# Patient Record
Sex: FEMALE | Race: OTHER | HISPANIC OR LATINO | Employment: UNEMPLOYED | URBAN - METROPOLITAN AREA
[De-identification: names, ages, dates, MRNs, and addresses within clinical notes are randomized per-mention and may not be internally consistent; named-entity substitution may affect disease eponyms.]

---

## 2023-01-07 LAB
ABO + RH BLD: NORMAL
HCT VFR BLD AUTO: 36.4 % (ref 36–46)
HGB BLD-MCNC: 11.9 G/DL (ref 12–16)
PLATELET # BLD AUTO: 238 K/UL (ref 150–399)

## 2023-04-24 ENCOUNTER — TELEPHONE (OUTPATIENT)
Dept: OBSTETRICS AND GYNECOLOGY | Facility: CLINIC | Age: 38
End: 2023-04-24
Payer: MEDICAID

## 2023-04-24 ENCOUNTER — INITIAL PRENATAL (OUTPATIENT)
Dept: OBSTETRICS AND GYNECOLOGY | Facility: CLINIC | Age: 38
End: 2023-04-24
Payer: MEDICAID

## 2023-04-24 VITALS — SYSTOLIC BLOOD PRESSURE: 107 MMHG | DIASTOLIC BLOOD PRESSURE: 65 MMHG | WEIGHT: 143.5 LBS

## 2023-04-24 DIAGNOSIS — O09.522 AMA (ADVANCED MATERNAL AGE) MULTIGRAVIDA 35+, SECOND TRIMESTER: ICD-10-CM

## 2023-04-24 DIAGNOSIS — Z3A.22 22 WEEKS GESTATION OF PREGNANCY: ICD-10-CM

## 2023-04-24 DIAGNOSIS — Z34.92 NORMAL PREGNANCY IN SECOND TRIMESTER: Primary | ICD-10-CM

## 2023-04-24 DIAGNOSIS — O09.92 SUPERVISION OF HIGH RISK PREGNANCY IN SECOND TRIMESTER: Primary | ICD-10-CM

## 2023-04-24 DIAGNOSIS — O09.32 INSUFFICIENT PRENATAL CARE IN SECOND TRIMESTER: ICD-10-CM

## 2023-04-24 PROBLEM — F41.9 ANXIETY: Status: ACTIVE | Noted: 2023-04-24

## 2023-04-24 PROBLEM — J45.41 MODERATE PERSISTENT ASTHMA WITH EXACERBATION: Status: ACTIVE | Noted: 2023-02-21

## 2023-04-24 PROCEDURE — 99999 PR PBB SHADOW E&M-NEW PATIENT-LVL II: CPT | Mod: PBBFAC,,, | Performed by: ADVANCED PRACTICE MIDWIFE

## 2023-04-24 PROCEDURE — 99999 PR PBB SHADOW E&M-NEW PATIENT-LVL II: ICD-10-PCS | Mod: PBBFAC,,, | Performed by: ADVANCED PRACTICE MIDWIFE

## 2023-04-24 PROCEDURE — 99214 PR OFFICE/OUTPT VISIT, EST, LEVL IV, 30-39 MIN: ICD-10-PCS | Mod: TH,S$PBB,, | Performed by: ADVANCED PRACTICE MIDWIFE

## 2023-04-24 PROCEDURE — 87086 URINE CULTURE/COLONY COUNT: CPT | Performed by: ADVANCED PRACTICE MIDWIFE

## 2023-04-24 PROCEDURE — 99202 OFFICE O/P NEW SF 15 MIN: CPT | Mod: PBBFAC,TH | Performed by: ADVANCED PRACTICE MIDWIFE

## 2023-04-24 PROCEDURE — 87591 N.GONORRHOEAE DNA AMP PROB: CPT | Performed by: ADVANCED PRACTICE MIDWIFE

## 2023-04-24 PROCEDURE — 99214 OFFICE O/P EST MOD 30 MIN: CPT | Mod: TH,S$PBB,, | Performed by: ADVANCED PRACTICE MIDWIFE

## 2023-04-24 RX ORDER — ASCORBIC ACID, CHOLECALCIFEROL, DL-.ALPHA.-TOCOPHEROL ACETATE, THIAMINE HYDROCHLORIDE, RIBOFLAVIN, NIACINAMIDE, PYRIDOXINE HYDROCHLORIDE, FOLIC ACID, CYANOCOBALAMIN, CALCIUM CARBONATE, FERROUS FUMARATE, ZINC OXIDE, CUPRIC SULFATE 100; 400; 30; 1.6; 1.6; 20; 3.1; 1; 12; 200; 27; 10; 2 MG/1; [IU]/1; [IU]/1; MG/1; MG/1; MG/1; MG/1; MG/1; UG/1; MG/1; MG/1; MG/1; MG/1
1 TABLET, COATED ORAL DAILY
Qty: 90 TABLET | Refills: 3 | Status: SHIPPED | OUTPATIENT
Start: 2023-04-24 | End: 2023-05-04

## 2023-04-24 RX ORDER — FERROUS SULFATE 325(65) MG
325 TABLET, DELAYED RELEASE (ENTERIC COATED) ORAL 2 TIMES DAILY
Qty: 60 TABLET | Refills: 3 | Status: SHIPPED | OUTPATIENT
Start: 2023-04-24 | End: 2023-05-04

## 2023-04-24 RX ORDER — ALBUTEROL SULFATE 90 UG/1
2 AEROSOL, METERED RESPIRATORY (INHALATION) EVERY 4 HOURS PRN
COMMUNITY
Start: 2023-04-10

## 2023-04-24 RX ORDER — FERROUS SULFATE 325(65) MG
325 TABLET, DELAYED RELEASE (ENTERIC COATED) ORAL 2 TIMES DAILY
Qty: 60 TABLET | Refills: 3 | Status: SHIPPED | OUTPATIENT
Start: 2023-04-24 | End: 2023-04-24 | Stop reason: SDUPTHER

## 2023-04-24 RX ORDER — ASCORBIC ACID, CHOLECALCIFEROL, DL-.ALPHA.-TOCOPHEROL ACETATE, THIAMINE HYDROCHLORIDE, RIBOFLAVIN, NIACINAMIDE, PYRIDOXINE HYDROCHLORIDE, FOLIC ACID, CYANOCOBALAMIN, CALCIUM CARBONATE, FERROUS FUMARATE, ZINC OXIDE, CUPRIC SULFATE 100; 400; 30; 1.6; 1.6; 20; 3.1; 1; 12; 200; 27; 10; 2 MG/1; [IU]/1; [IU]/1; MG/1; MG/1; MG/1; MG/1; MG/1; UG/1; MG/1; MG/1; MG/1; MG/1
1 TABLET, COATED ORAL DAILY
Qty: 90 TABLET | Refills: 3 | Status: SHIPPED | OUTPATIENT
Start: 2023-04-24 | End: 2023-04-24 | Stop reason: SDUPTHER

## 2023-04-24 RX ORDER — ALBUTEROL SULFATE 0.83 MG/ML
2.5 SOLUTION RESPIRATORY (INHALATION) EVERY 6 HOURS PRN
COMMUNITY
Start: 2023-02-23 | End: 2024-02-23

## 2023-04-24 RX ORDER — CETIRIZINE HYDROCHLORIDE 10 MG/1
10 TABLET ORAL DAILY
COMMUNITY
Start: 2023-02-23

## 2023-04-24 RX ORDER — ASPIRIN 81 MG/1
81 TABLET ORAL DAILY
Refills: 0 | Status: ON HOLD | COMMUNITY
Start: 2023-04-24 | End: 2023-08-25 | Stop reason: HOSPADM

## 2023-04-24 NOTE — PROGRESS NOTES
NOB [facility] and given and EDC 8/28/23  Lbs ordered today  sono at next visit  Patient does not drive and has transportation issues  RX given for vitamins and iron   [facility]  A-Z book given  Refuses pap and genprobe today  Urine collected will do genprobe and pap pp

## 2023-04-24 NOTE — TELEPHONE ENCOUNTER
----- Message from Vania David sent at 4/24/2023  1:26 PM CDT -----  Contact: Carolyn  Patient is calling to speak with the nurse regarding medication. Reports prenatal vit103-iron fum-folic () 27 mg iron- 1 mg Tab was sent to the wrong pharmacy. Please sent .  [facility] - [place] - [address] [facility]  [address] [facility]  [place] [address]  Phone: [phone] Fax: [phone]    Please give patient a call back at .[phone] to notify.

## 2023-04-24 NOTE — TELEPHONE ENCOUNTER
----- Message from Vania David sent at 4/24/2023  1:26 PM CDT -----  Contact: Carolyn  Patient is calling to speak with the nurse regarding medication. Reports prenatal vit103-iron fum-folic () 27 mg iron- 1 mg Tab was sent to the wrong pharmacy. Please sent .  [facility] - [place] - [address] [facility]  [address] [facility]  [place] [address]  Phone: [phone] Fax: [phone]    Please give patient a call back at .[phone] to notify.

## 2023-04-26 LAB
BACTERIA UR CULT: NO GROWTH
C TRACH DNA SPEC QL NAA+PROBE: NOT DETECTED
N GONORRHOEA DNA SPEC QL NAA+PROBE: NOT DETECTED

## 2023-05-01 ENCOUNTER — TELEPHONE (OUTPATIENT)
Dept: OBSTETRICS AND GYNECOLOGY | Facility: CLINIC | Age: 38
End: 2023-05-01
Payer: MEDICAID

## 2023-05-01 NOTE — TELEPHONE ENCOUNTER
Incoming call from patient. Patient states she missed transports call from pickup and when she tried to call back, transport told patient they will not turn around for her. Assisted patient with canceling and rescheduling appointment 3 days in advance. Patient verbalized understanding of appointment time and date.

## 2023-05-01 NOTE — TELEPHONE ENCOUNTER
Called patient and advised unable to reschedule appointments today.  Patient will keep appointments as scheduled on Thursday.

## 2023-05-01 NOTE — TELEPHONE ENCOUNTER
----- Message from Vianca Castro sent at 5/1/2023  8:54 AM CDT -----  Regarding: Please advise  Who Called:LEONID PINEDO [[id number]]         What is the reqeust in detail: Requesting call back to discuss if pt can come in today for appt. Please advise         Can the clinic reply by [facility]?no         Best Call Back Number:[phone]           Additional Information:

## 2023-05-04 ENCOUNTER — ROUTINE PRENATAL (OUTPATIENT)
Dept: OBSTETRICS AND GYNECOLOGY | Facility: CLINIC | Age: 38
End: 2023-05-04
Payer: MEDICAID

## 2023-05-04 ENCOUNTER — PROCEDURE VISIT (OUTPATIENT)
Dept: OBSTETRICS AND GYNECOLOGY | Facility: CLINIC | Age: 38
End: 2023-05-04
Payer: MEDICAID

## 2023-05-04 VITALS
BODY MASS INDEX: 28.39 KG/M2 | WEIGHT: 144.63 LBS | SYSTOLIC BLOOD PRESSURE: 116 MMHG | HEIGHT: 60 IN | DIASTOLIC BLOOD PRESSURE: 62 MMHG

## 2023-05-04 DIAGNOSIS — O99.512 ASTHMA AFFECTING PREGNANCY IN SECOND TRIMESTER: ICD-10-CM

## 2023-05-04 DIAGNOSIS — O43.192 MARGINAL INSERTION OF UMBILICAL CORD AFFECTING MANAGEMENT OF MOTHER IN SECOND TRIMESTER: ICD-10-CM

## 2023-05-04 DIAGNOSIS — Z34.92 NORMAL PREGNANCY IN SECOND TRIMESTER: ICD-10-CM

## 2023-05-04 DIAGNOSIS — O09.522 AMA (ADVANCED MATERNAL AGE) MULTIGRAVIDA 35+, SECOND TRIMESTER: Primary | ICD-10-CM

## 2023-05-04 DIAGNOSIS — Z36.2 ENCOUNTER FOR FOLLOW-UP ULTRASOUND OF FETAL ANATOMY: ICD-10-CM

## 2023-05-04 DIAGNOSIS — J45.909 ASTHMA AFFECTING PREGNANCY IN SECOND TRIMESTER: ICD-10-CM

## 2023-05-04 PROBLEM — F32.A DEPRESSION, UNSPECIFIED: Status: ACTIVE | Noted: 2022-03-28

## 2023-05-04 PROCEDURE — 99214 OFFICE O/P EST MOD 30 MIN: CPT | Mod: TH,S$PBB,, | Performed by: ADVANCED PRACTICE MIDWIFE

## 2023-05-04 PROCEDURE — 99999 PR PBB SHADOW E&M-EST. PATIENT-LVL III: CPT | Mod: PBBFAC,,, | Performed by: ADVANCED PRACTICE MIDWIFE

## 2023-05-04 PROCEDURE — 99214 PR OFFICE/OUTPT VISIT, EST, LEVL IV, 30-39 MIN: ICD-10-PCS | Mod: TH,S$PBB,, | Performed by: ADVANCED PRACTICE MIDWIFE

## 2023-05-04 PROCEDURE — 99999 PR PBB SHADOW E&M-EST. PATIENT-LVL III: ICD-10-PCS | Mod: PBBFAC,,, | Performed by: ADVANCED PRACTICE MIDWIFE

## 2023-05-04 PROCEDURE — 99213 OFFICE O/P EST LOW 20 MIN: CPT | Mod: PBBFAC,TH,PO,25 | Performed by: ADVANCED PRACTICE MIDWIFE

## 2023-05-04 PROCEDURE — 76811 US OB/GYN PROCEDURE (VIEWPOINT): ICD-10-PCS | Mod: 26,S$PBB,, | Performed by: OBSTETRICS & GYNECOLOGY

## 2023-05-04 PROCEDURE — 76811 OB US DETAILED SNGL FETUS: CPT | Mod: PBBFAC,PO | Performed by: OBSTETRICS & GYNECOLOGY

## 2023-05-04 RX ORDER — ONDANSETRON 4 MG/1
4 TABLET, ORALLY DISINTEGRATING ORAL 2 TIMES DAILY
Qty: 30 TABLET | Refills: 0 | Status: SHIPPED | OUTPATIENT
Start: 2023-05-04 | End: 2023-06-08 | Stop reason: SDUPTHER

## 2023-05-04 RX ORDER — PROMETHAZINE HYDROCHLORIDE 12.5 MG/1
12.5 TABLET ORAL EVERY 6 HOURS PRN
Qty: 30 TABLET | Refills: 0 | Status: SHIPPED | OUTPATIENT
Start: 2023-05-04 | End: 2023-06-08 | Stop reason: SDUPTHER

## 2023-05-04 NOTE — PROGRESS NOTES
37 y.o. female  at 23w3d   Reports + FM, denies VB, LOF, or cramping  Doing well without concerns, some nausea off and on, requesting medication   /62   Wt 65.6 kg (144 lb 10 oz)   TW lbs   Reviewed upcoming 28wk labs, (O+) and orders placed  Some prenatal labs done at [facility] at beginning of pregnancy, will add ones that are missing to 28wk labs  Tdap handout provided and explained    AMA (advanced maternal age) multigravida 35+, second trimester  -     Hepatitis Panel, Acute; Future; Expected date: 2023  -     Rubella Antibody, IgG; Future; Expected date: 2023  -     HIV 1/2 Ag/Ab (4th Gen); Future; Expected date: 2023  -     RPR; Future; Expected date: 2023  -     OB GLUCOSE SCREEN; Future; Expected date: 2023  -     Type & Screen; Future; Expected date: 2023  -     CBC W/ AUTO DIFFERENTIAL; Future; Expected date: 2023    Encounter for follow-up ultrasound of fetal anatomy    Asthma affecting pregnancy in second trimester  -     Ambulatory referral/consult to Pulmonology; Future; Expected date: 2023    Other orders  -     ondansetron (ZOFRAN-ODT) 4 MG TbDL; Take 1 tablet (4 mg total) by mouth 2 (two) times daily.  Dispense: 30 tablet; Refill: 0  -     promethazine (PHENERGAN) 12.5 MG Tab; Take 1 tablet (12.5 mg total) by mouth every 6 (six) hours as needed (Nausea).  Dispense: 30 tablet; Refill: 0       Ultrasound today with cephalic presentation, posterior placenta, 3VC, marginal insertion, MEGHAN WNL, EFW 36%, 1lb 5oz, CL34.9mm, suboptimal spine/heart, will repeat in 4 weeks, pt aware MFM will read scan  Reviewed warning signs, normal FM,  labor precautions and how/when to call.  RTC x 4 wks, call or present sooner prn.

## 2023-06-08 ENCOUNTER — TELEPHONE (OUTPATIENT)
Dept: OBSTETRICS AND GYNECOLOGY | Facility: CLINIC | Age: 38
End: 2023-06-08
Payer: MEDICAID

## 2023-06-08 ENCOUNTER — LAB VISIT (OUTPATIENT)
Dept: LAB | Facility: HOSPITAL | Age: 38
End: 2023-06-08
Attending: ADVANCED PRACTICE MIDWIFE
Payer: MEDICAID

## 2023-06-08 ENCOUNTER — PROCEDURE VISIT (OUTPATIENT)
Dept: OBSTETRICS AND GYNECOLOGY | Facility: CLINIC | Age: 38
End: 2023-06-08
Payer: MEDICAID

## 2023-06-08 ENCOUNTER — ROUTINE PRENATAL (OUTPATIENT)
Dept: OBSTETRICS AND GYNECOLOGY | Facility: CLINIC | Age: 38
End: 2023-06-08
Payer: MEDICAID

## 2023-06-08 VITALS
BODY MASS INDEX: 30.53 KG/M2 | WEIGHT: 156.31 LBS | DIASTOLIC BLOOD PRESSURE: 68 MMHG | SYSTOLIC BLOOD PRESSURE: 116 MMHG

## 2023-06-08 DIAGNOSIS — O43.192 MARGINAL INSERTION OF UMBILICAL CORD AFFECTING MANAGEMENT OF MOTHER IN SECOND TRIMESTER: ICD-10-CM

## 2023-06-08 DIAGNOSIS — O09.522 AMA (ADVANCED MATERNAL AGE) MULTIGRAVIDA 35+, SECOND TRIMESTER: ICD-10-CM

## 2023-06-08 DIAGNOSIS — O40.3XX0 POLYHYDRAMNIOS IN THIRD TRIMESTER COMPLICATION, SINGLE OR UNSPECIFIED FETUS: ICD-10-CM

## 2023-06-08 DIAGNOSIS — Z34.92 NORMAL PREGNANCY IN SECOND TRIMESTER: ICD-10-CM

## 2023-06-08 DIAGNOSIS — B37.31 VAGINA, CANDIDIASIS: ICD-10-CM

## 2023-06-08 DIAGNOSIS — O09.523 AMA (ADVANCED MATERNAL AGE) MULTIGRAVIDA 35+, THIRD TRIMESTER: Primary | ICD-10-CM

## 2023-06-08 LAB
ABO + RH BLD: NORMAL
BASOPHILS # BLD AUTO: 0.07 K/UL (ref 0–0.2)
BASOPHILS NFR BLD: 0.5 % (ref 0–1.9)
BLD GP AB SCN CELLS X3 SERPL QL: NORMAL
DIFFERENTIAL METHOD: ABNORMAL
EOSINOPHIL # BLD AUTO: 0.2 K/UL (ref 0–0.5)
EOSINOPHIL NFR BLD: 1 % (ref 0–8)
ERYTHROCYTE [DISTWIDTH] IN BLOOD BY AUTOMATED COUNT: 16 % (ref 11.5–14.5)
GLUCOSE SERPL-MCNC: 89 MG/DL (ref 70–140)
HCT VFR BLD AUTO: 28.6 % (ref 37–48.5)
HGB BLD-MCNC: 8.8 G/DL (ref 12–16)
IMM GRANULOCYTES # BLD AUTO: 0.49 K/UL (ref 0–0.04)
IMM GRANULOCYTES NFR BLD AUTO: 3.3 % (ref 0–0.5)
LYMPHOCYTES # BLD AUTO: 2.1 K/UL (ref 1–4.8)
LYMPHOCYTES NFR BLD: 14.1 % (ref 18–48)
MCH RBC QN AUTO: 26.6 PG (ref 27–31)
MCHC RBC AUTO-ENTMCNC: 30.8 G/DL (ref 32–36)
MCV RBC AUTO: 86 FL (ref 82–98)
MONOCYTES # BLD AUTO: 1.5 K/UL (ref 0.3–1)
MONOCYTES NFR BLD: 9.8 % (ref 4–15)
NEUTROPHILS # BLD AUTO: 10.7 K/UL (ref 1.8–7.7)
NEUTROPHILS NFR BLD: 71.3 % (ref 38–73)
NRBC BLD-RTO: 0 /100 WBC
PLATELET # BLD AUTO: 277 K/UL (ref 150–450)
PMV BLD AUTO: 11.2 FL (ref 9.2–12.9)
RBC # BLD AUTO: 3.31 M/UL (ref 4–5.4)
SPECIMEN OUTDATE: NORMAL
WBC # BLD AUTO: 14.93 K/UL (ref 3.9–12.7)

## 2023-06-08 PROCEDURE — 99999 PR PBB SHADOW E&M-EST. PATIENT-LVL III: CPT | Mod: PBBFAC,,, | Performed by: ADVANCED PRACTICE MIDWIFE

## 2023-06-08 PROCEDURE — 86592 SYPHILIS TEST NON-TREP QUAL: CPT | Performed by: ADVANCED PRACTICE MIDWIFE

## 2023-06-08 PROCEDURE — 82950 GLUCOSE TEST: CPT | Performed by: ADVANCED PRACTICE MIDWIFE

## 2023-06-08 PROCEDURE — 86762 RUBELLA ANTIBODY: CPT | Performed by: ADVANCED PRACTICE MIDWIFE

## 2023-06-08 PROCEDURE — 76816 US OB/GYN PROCEDURE (VIEWPOINT): ICD-10-PCS | Mod: 26,S$PBB,, | Performed by: OBSTETRICS & GYNECOLOGY

## 2023-06-08 PROCEDURE — 87389 HIV-1 AG W/HIV-1&-2 AB AG IA: CPT | Performed by: ADVANCED PRACTICE MIDWIFE

## 2023-06-08 PROCEDURE — 85025 COMPLETE CBC W/AUTO DIFF WBC: CPT | Performed by: ADVANCED PRACTICE MIDWIFE

## 2023-06-08 PROCEDURE — 36415 COLL VENOUS BLD VENIPUNCTURE: CPT | Mod: PO | Performed by: ADVANCED PRACTICE MIDWIFE

## 2023-06-08 PROCEDURE — 76816 OB US FOLLOW-UP PER FETUS: CPT | Mod: PBBFAC,PO | Performed by: OBSTETRICS & GYNECOLOGY

## 2023-06-08 PROCEDURE — 99214 PR OFFICE/OUTPT VISIT, EST, LEVL IV, 30-39 MIN: ICD-10-PCS | Mod: TH,S$PBB,, | Performed by: ADVANCED PRACTICE MIDWIFE

## 2023-06-08 PROCEDURE — 86900 BLOOD TYPING SEROLOGIC ABO: CPT | Performed by: ADVANCED PRACTICE MIDWIFE

## 2023-06-08 PROCEDURE — 99214 OFFICE O/P EST MOD 30 MIN: CPT | Mod: TH,S$PBB,, | Performed by: ADVANCED PRACTICE MIDWIFE

## 2023-06-08 PROCEDURE — 99213 OFFICE O/P EST LOW 20 MIN: CPT | Mod: PBBFAC,TH,PO | Performed by: ADVANCED PRACTICE MIDWIFE

## 2023-06-08 PROCEDURE — 80074 ACUTE HEPATITIS PANEL: CPT | Performed by: ADVANCED PRACTICE MIDWIFE

## 2023-06-08 PROCEDURE — 99999 PR PBB SHADOW E&M-EST. PATIENT-LVL III: ICD-10-PCS | Mod: PBBFAC,,, | Performed by: ADVANCED PRACTICE MIDWIFE

## 2023-06-08 RX ORDER — PROMETHAZINE HYDROCHLORIDE 12.5 MG/1
12.5 TABLET ORAL EVERY 6 HOURS PRN
Qty: 30 TABLET | Refills: 0 | Status: ON HOLD | OUTPATIENT
Start: 2023-06-08 | End: 2023-08-25 | Stop reason: HOSPADM

## 2023-06-08 RX ORDER — FLUCONAZOLE 100 MG/1
100 TABLET ORAL DAILY
Qty: 3 TABLET | Refills: 0 | Status: SHIPPED | OUTPATIENT
Start: 2023-06-08 | End: 2023-06-11

## 2023-06-08 RX ORDER — ONDANSETRON 4 MG/1
4 TABLET, ORALLY DISINTEGRATING ORAL 2 TIMES DAILY
Qty: 30 TABLET | Refills: 0 | Status: SHIPPED | OUTPATIENT
Start: 2023-06-08

## 2023-06-08 NOTE — TELEPHONE ENCOUNTER
After calling patient with no response. [facility] check in called nurses station saying pt just received call about appt being cancelled. Check in stated pt has been sitting in lobby and did not check in for appt. Per Jessica, she will still see patient.

## 2023-06-08 NOTE — PROGRESS NOTES
38 y.o. female  at 28w3d   Reports + FM, denies VB, LOF or CTX  Doing well without concerns   /68   Wt 70.9 kg (156 lb 4.9 oz)   BMI 30.53 kg/m²   TW lbs   28wk labs today (O POS)  Tdap declined    AMA (advanced maternal age) multigravida 35+, third trimester    Marginal insertion of umbilical cord affecting management of mother in second trimester    Ultrasound today with variable presentation, posterior placenta, 3VC, MVP 10.0cm, MEGHAN 27.9cm, polyhydramnios, EFW 44%, 2lb 12oz, reviewed with pt, will repeat at 32 weeks  Reviewed warning signs, normal FKCs,  labor precautions and how/when to call.  RTC x 2 wks, call or present sooner prn.

## 2023-06-08 NOTE — TELEPHONE ENCOUNTER
Called to inform pt that she missed her 1:40 pm ultrasound and to assist with r/s. Patient did not answer. LVM to rtc to clinc.

## 2023-06-09 LAB
HAV IGM SERPL QL IA: NORMAL
HBV CORE IGM SERPL QL IA: NORMAL
HBV SURFACE AG SERPL QL IA: NORMAL
HCV AB SERPL QL IA: NORMAL
HIV 1+2 AB+HIV1 P24 AG SERPL QL IA: NORMAL
RPR SER QL: NORMAL
RUBV IGG SER-ACNC: 17.6 IU/ML
RUBV IGG SER-IMP: REACTIVE

## 2023-06-15 ENCOUNTER — TELEPHONE (OUTPATIENT)
Dept: OBSTETRICS AND GYNECOLOGY | Facility: CLINIC | Age: 38
End: 2023-06-15
Payer: MEDICAID

## 2023-06-15 ENCOUNTER — TELEPHONE (OUTPATIENT)
Dept: HEMATOLOGY/ONCOLOGY | Facility: CLINIC | Age: 38
End: 2023-06-15
Payer: MEDICAID

## 2023-06-15 DIAGNOSIS — O99.013 ANEMIA OF MOTHER IN PREGNANCY, ANTEPARTUM, THIRD TRIMESTER: ICD-10-CM

## 2023-06-15 NOTE — TELEPHONE ENCOUNTER
----- Message from Clovis Owens CNM sent at 6/15/2023 12:55 PM CDT -----  Pt has 2:15 visit with me tomorrow with 3pm ultrasound. I also have 3 patients after 3pm. Can you please call her and see if she can come for an ultrasound at 9AM tomorrow at the [place]? I have placed her in the spot just in case. Thank you.

## 2023-06-22 ENCOUNTER — TELEPHONE (OUTPATIENT)
Dept: OBSTETRICS AND GYNECOLOGY | Facility: CLINIC | Age: 38
End: 2023-06-22
Payer: MEDICAID

## 2023-06-22 NOTE — TELEPHONE ENCOUNTER
----- Message from Angie Flores sent at 6/22/2023 12:24 PM CDT -----  Regarding: pt request  Name of Who is Calling:LEONID PIENDO [[id number]]          What is the request in detail: pt needs to reschedule her appt 7/27 is too far           Can the clinic reply by [facility]: no           What Number to Call Back if not in [facility]: [phone] (home)

## 2023-06-27 ENCOUNTER — ROUTINE PRENATAL (OUTPATIENT)
Dept: OBSTETRICS AND GYNECOLOGY | Facility: CLINIC | Age: 38
End: 2023-06-27
Payer: MEDICAID

## 2023-06-27 VITALS
SYSTOLIC BLOOD PRESSURE: 118 MMHG | WEIGHT: 161.63 LBS | BODY MASS INDEX: 31.56 KG/M2 | DIASTOLIC BLOOD PRESSURE: 64 MMHG

## 2023-06-27 DIAGNOSIS — O40.3XX0 POLYHYDRAMNIOS IN THIRD TRIMESTER COMPLICATION, SINGLE OR UNSPECIFIED FETUS: Primary | ICD-10-CM

## 2023-06-27 DIAGNOSIS — O23.43 UTI (URINARY TRACT INFECTION) DURING PREGNANCY, THIRD TRIMESTER: ICD-10-CM

## 2023-06-27 DIAGNOSIS — O99.013 ANEMIA OF MOTHER IN PREGNANCY, ANTEPARTUM, THIRD TRIMESTER: ICD-10-CM

## 2023-06-27 DIAGNOSIS — O09.523 AMA (ADVANCED MATERNAL AGE) MULTIGRAVIDA 35+, THIRD TRIMESTER: ICD-10-CM

## 2023-06-27 PROCEDURE — 99214 PR OFFICE/OUTPT VISIT, EST, LEVL IV, 30-39 MIN: ICD-10-PCS | Mod: TH,S$PBB,, | Performed by: ADVANCED PRACTICE MIDWIFE

## 2023-06-27 PROCEDURE — 99213 OFFICE O/P EST LOW 20 MIN: CPT | Mod: PBBFAC,TH,PO | Performed by: ADVANCED PRACTICE MIDWIFE

## 2023-06-27 PROCEDURE — 99999 PR PBB SHADOW E&M-EST. PATIENT-LVL III: CPT | Mod: PBBFAC,,, | Performed by: ADVANCED PRACTICE MIDWIFE

## 2023-06-27 PROCEDURE — 99214 OFFICE O/P EST MOD 30 MIN: CPT | Mod: TH,S$PBB,, | Performed by: ADVANCED PRACTICE MIDWIFE

## 2023-06-27 PROCEDURE — 99999 PR PBB SHADOW E&M-EST. PATIENT-LVL III: ICD-10-PCS | Mod: PBBFAC,,, | Performed by: ADVANCED PRACTICE MIDWIFE

## 2023-06-27 NOTE — PROGRESS NOTES
38 y.o. female  at 31w1d   Reports + FM, denies VB, LOF or CTX  Doing well without concerns, did mention that she went to [facility] and was diagnosed with UTI, was given Macrobid that she is currently taking, will retest at nv  /64   Wt 73.3 kg (161 lb 9.6 oz)   BMI 31.56 kg/m²   TW lbs   Reviewed 28wk lab results (O POS)  Anemia, reviewed with pt and referral placed already with hematology, just waiting for call to set up  Passed GTT  Tdap declined    Polyhydramnios in third trimester complication, single or unspecified fetus  -     US OB/GYN Procedure ([facility])-Future; Future    AMA (advanced maternal age) multigravida 35+, third trimester    Anemia of mother in pregnancy, antepartum, third trimester    UTI (urinary tract infection) during pregnancy, third trimester    Reviewed warning signs, normal FKCs,  labor precautions and how/when to call.  RTC x 2 wks, call or present sooner prn.

## 2023-06-28 PROBLEM — O23.43 UTI (URINARY TRACT INFECTION) DURING PREGNANCY, THIRD TRIMESTER: Status: ACTIVE | Noted: 2023-06-28

## 2023-07-26 ENCOUNTER — HOSPITAL ENCOUNTER (OUTPATIENT)
Facility: HOSPITAL | Age: 38
Discharge: HOME | End: 2023-07-26
Attending: OBSTETRICS & GYNECOLOGY | Admitting: OBSTETRICS & GYNECOLOGY
Payer: MEDICAID

## 2023-07-26 VITALS — OXYGEN SATURATION: 100 % | SYSTOLIC BLOOD PRESSURE: 116 MMHG | DIASTOLIC BLOOD PRESSURE: 78 MMHG | HEART RATE: 95 BPM

## 2023-07-26 DIAGNOSIS — B96.89 BV (BACTERIAL VAGINOSIS): Primary | ICD-10-CM

## 2023-07-26 DIAGNOSIS — N76.0 BV (BACTERIAL VAGINOSIS): Primary | ICD-10-CM

## 2023-07-26 DIAGNOSIS — O26.893 PELVIC PRESSURE IN PREGNANCY, ANTEPARTUM, THIRD TRIMESTER: ICD-10-CM

## 2023-07-26 DIAGNOSIS — R10.2 PELVIC PRESSURE IN PREGNANCY, ANTEPARTUM, THIRD TRIMESTER: ICD-10-CM

## 2023-07-26 PROBLEM — B37.31 YEAST INFECTION OF THE VAGINA: Status: ACTIVE | Noted: 2023-07-26

## 2023-07-26 LAB
BACTERIA #/AREA URNS HPF: ABNORMAL /HPF
BILIRUB UR QL STRIP: NEGATIVE
CLARITY UR: ABNORMAL
COLOR UR: YELLOW
GLUCOSE UR QL STRIP: ABNORMAL
HGB UR QL STRIP: NEGATIVE
HYALINE CASTS #/AREA URNS LPF: 1 /LPF
KETONES UR QL STRIP: ABNORMAL
LEUKOCYTE ESTERASE UR QL STRIP: ABNORMAL
MICROSCOPIC COMMENT: ABNORMAL
NITRITE UR QL STRIP: NEGATIVE
PH UR STRIP: 6 [PH] (ref 5–8)
PROT UR QL STRIP: ABNORMAL
RBC #/AREA URNS HPF: 3 /HPF (ref 0–4)
SP GR UR STRIP: 1.03 (ref 1–1.03)
SQUAMOUS #/AREA URNS HPF: 3 /HPF
URN SPEC COLLECT METH UR: ABNORMAL
UROBILINOGEN UR STRIP-ACNC: NEGATIVE EU/DL
WBC #/AREA URNS HPF: 11 /HPF (ref 0–5)
WBC CLUMPS URNS QL MICRO: ABNORMAL

## 2023-07-26 PROCEDURE — 87077 CULTURE AEROBIC IDENTIFY: CPT | Performed by: ADVANCED PRACTICE MIDWIFE

## 2023-07-26 PROCEDURE — 87081 CULTURE SCREEN ONLY: CPT | Mod: 59 | Performed by: ADVANCED PRACTICE MIDWIFE

## 2023-07-26 PROCEDURE — 25000003 PHARM REV CODE 250: Performed by: ADVANCED PRACTICE MIDWIFE

## 2023-07-26 PROCEDURE — 59025 FETAL NON-STRESS TEST: CPT

## 2023-07-26 PROCEDURE — 87086 URINE CULTURE/COLONY COUNT: CPT | Performed by: ADVANCED PRACTICE MIDWIFE

## 2023-07-26 PROCEDURE — 99211 OFF/OP EST MAY X REQ PHY/QHP: CPT | Mod: 25

## 2023-07-26 PROCEDURE — 87184 SC STD DISK METHOD PER PLATE: CPT | Performed by: ADVANCED PRACTICE MIDWIFE

## 2023-07-26 PROCEDURE — 87210 SMEAR WET MOUNT SALINE/INK: CPT | Mod: QW,,, | Performed by: ADVANCED PRACTICE MIDWIFE

## 2023-07-26 PROCEDURE — 81000 URINALYSIS NONAUTO W/SCOPE: CPT | Performed by: ADVANCED PRACTICE MIDWIFE

## 2023-07-26 PROCEDURE — 87210 PR  SMEAR,STAIN,WET MNT,INTERP: ICD-10-PCS | Mod: QW,,, | Performed by: ADVANCED PRACTICE MIDWIFE

## 2023-07-26 PROCEDURE — 99214 OFFICE O/P EST MOD 30 MIN: CPT | Mod: 25,TH,, | Performed by: ADVANCED PRACTICE MIDWIFE

## 2023-07-26 PROCEDURE — 99214 PR OFFICE/OUTPT VISIT, EST, LEVL IV, 30-39 MIN: ICD-10-PCS | Mod: 25,TH,, | Performed by: ADVANCED PRACTICE MIDWIFE

## 2023-07-26 PROCEDURE — 59025 OBTAIN FETAL NONSTRESS TEST (NST): ICD-10-PCS | Mod: 26,,, | Performed by: ADVANCED PRACTICE MIDWIFE

## 2023-07-26 PROCEDURE — 59025 FETAL NON-STRESS TEST: CPT | Mod: 26,,, | Performed by: ADVANCED PRACTICE MIDWIFE

## 2023-07-26 RX ORDER — FLUCONAZOLE 100 MG/1
100 TABLET ORAL DAILY
Status: DISCONTINUED | OUTPATIENT
Start: 2023-07-27 | End: 2023-07-26

## 2023-07-26 RX ORDER — METRONIDAZOLE 500 MG/1
500 TABLET ORAL 2 TIMES DAILY
Qty: 14 TABLET | Refills: 0 | Status: SHIPPED | OUTPATIENT
Start: 2023-07-26 | End: 2023-08-02

## 2023-07-26 RX ORDER — ONDANSETRON 8 MG/1
8 TABLET, ORALLY DISINTEGRATING ORAL EVERY 8 HOURS PRN
Status: DISCONTINUED | OUTPATIENT
Start: 2023-07-26 | End: 2023-07-26 | Stop reason: HOSPADM

## 2023-07-26 RX ORDER — ACETAMINOPHEN 500 MG
500 TABLET ORAL EVERY 6 HOURS PRN
Status: DISCONTINUED | OUTPATIENT
Start: 2023-07-26 | End: 2023-07-26 | Stop reason: HOSPADM

## 2023-07-26 RX ORDER — FLUCONAZOLE 150 MG/1
150 TABLET ORAL ONCE
Status: COMPLETED | OUTPATIENT
Start: 2023-07-26 | End: 2023-07-26

## 2023-07-26 RX ORDER — PROCHLORPERAZINE EDISYLATE 5 MG/ML
5 INJECTION INTRAMUSCULAR; INTRAVENOUS EVERY 6 HOURS PRN
Status: DISCONTINUED | OUTPATIENT
Start: 2023-07-26 | End: 2023-07-26 | Stop reason: HOSPADM

## 2023-07-26 RX ADMIN — FLUCONAZOLE 150 MG: 150 TABLET ORAL at 06:07

## 2023-07-26 NOTE — HPI
39yo  EGA 35w2d presents to LD with c/o abdominal and pelvic pressure for the past 24 hours. Denies contractions, VB,  LOF. Reports good fetal movement.

## 2023-07-26 NOTE — H&P
[facility] - Labor & Delivery  Obstetrics  History & Physical    Patient Name: Carolyn Mcfarlane  MRN: [id number]  Admission Date: 2023  Primary Care Provider: Sandhya Ramon NP    Subjective:     Principal Problem:Pelvic pressure in pregnancy, antepartum, third trimester    History of Present Illness:  39yo  EGA 35w2d presents to  with c/o abdominal and pelvic pressure for the past 24 hours. Denies contractions, VB,  LOF. Reports good fetal movement.       Obstetric HPI:  This pregnancy has been complicated by   Asthma  AMA  Anxiety/depression  Marginal insertion of umbilical cord  Polyhydramnios  Anemia  Hx UTI    OB History    Para Term  AB Living   2 1 1 0 0 1   SAB IAB Ectopic Multiple Live Births   0 0 0 0 1      # Outcome Date GA Lbr Josue/2nd Weight Sex Delivery Anes PTL Lv   2 Current            1 Term 11 38w0d  2.863 kg (6 lb 5 oz) M Vag-Spont EPI  DARRELL      Name: Amandeep     Past Medical History:   Diagnosis Date    Mild intermittent asthma, uncomplicated      History reviewed. No pertinent surgical history.    PTA Medications   Medication Sig    albuterol (PROVENTIL) 2.5 mg /3 mL (0.083 %) nebulizer solution Inhale 2.5 mg into the lungs every 6 (six) hours as needed.    albuterol (PROVENTIL/VENTOLIN HFA) 90 mcg/actuation inhaler 2 puffs every 4 (four) hours as needed.    aspirin (ECOTRIN) 81 MG EC tablet Take 1 tablet (81 mg total) by mouth once daily.    cetirizine (ZYRTEC) 10 MG tablet Take 10 mg by mouth once daily. As needed    ondansetron (ZOFRAN-ODT) 4 MG TbDL Take 1 tablet (4 mg total) by mouth 2 (two) times daily.    promethazine (PHENERGAN) 12.5 MG Tab Take 1 tablet (12.5 mg total) by mouth every 6 (six) hours as needed (Nausea).       Review of patient's allergies indicates:   Allergen Reactions    Clindamycin Anaphylaxis    Shellfish containing products Shortness Of Breath    Cherry flavor     Penicillins         Family History       Problem Relation (Age of Onset)     Diabetes Mother    Hypertension Mother          Tobacco Use    Smoking status: Former     Types: Cigarettes    Smokeless tobacco: Never   Substance and Sexual Activity    Alcohol use: Not Currently    Drug use: Not Currently    Sexual activity: Yes     Partners: Male     Review of Systems   Gastrointestinal:  Positive for abdominal pain.   Genitourinary:  Positive for pelvic pain (pressure). Negative for vaginal bleeding and vaginal discharge.   All other systems reviewed and are negative.   Objective:     Vital Signs (Most Recent):  Pulse: 95 (07/26/23 1800)  BP: 116/78 (07/26/23 1800)  SpO2: 100 % (07/26/23 1800) Vital Signs (24h Range):  Pulse:  [] 95  SpO2:  [99 %-100 %] 100 %  BP: (100-116)/(62-78) 116/78        There is no height or weight on file to calculate BMI.    FHT: 150 Cat 1 (reassuring)  TOCO:  none     Physical Exam:   Constitutional: She is oriented to person, place, and time. Vital signs are normal. She appears well-developed and well-nourished. She is cooperative.    HENT:   Head: Normocephalic.      Cardiovascular:  Normal rate, regular rhythm and normal heart sounds.             Pulmonary/Chest: Effort normal.        Abdominal: Soft.   Gravid, non-tender     Genitourinary:    Uterus normal.   There is vaginal discharge (small amount of white discharge noted, wet prep +clue  cells and yeast) in the vagina.           Musculoskeletal: Normal range of motion and moves all extremeties.       Neurological: She is alert and oriented to person, place, and time. She has normal strength.    Skin: Skin is warm and dry. Capillary refill takes less than 2 seconds.    Psychiatric: She has a normal mood and affect. Her speech is normal and behavior is normal. Judgment and thought content normal.      Cervix:  Dilation:  1  Effacement:  50%  Station: -3  Presentation: Unable to assess     Significant Labs:  Lab Results   Component Value Date    GROUPTR O POS 06/08/2023    HEPBSAG Non-reactive  2023       I have personallly reviewed all pertinent lab results from the last 24 hours.  Recent Lab Results       None          Assessment/Plan:     38 y.o. female  at 35w2d for:    * Pelvic pressure in pregnancy, antepartum, third trimester  NST reactive and  reassuring  No contractions noted on toco  Speculum exam: small amount of white discharge noted, wet prep +BV and yeast, VE /-3 (states she was 1cm on her last exam)  UA  ordered, patient does not want to wait for results, ready to go home.  Will give diflucan prior to discharge and send flagyl RX to pharmacy        Courtney Mccall CNM  Obstetrics  [facility] - Labor & Delivery

## 2023-07-26 NOTE — PROCEDURES
Carolyn Mcfarlane is a 38 y.o. female patient.    Pulse: 95 (07/26/23 1800)  BP: 116/78 (07/26/23 1800)  SpO2: 100 % (07/26/23 1800)       Obtain Fetal nonstress test (NST)    Date/Time: 7/26/2023 6:39 PM  Performed by: Courtney Mccall CNM  Authorized by: Courtney Mccall CNM     Nonstress Test:     Variability:  6-25 BPM    Decelerations:  None    Accelerations:  15 bpm    Baseline:  150    Uterine Irritability: No      Contractions:  Not present  Biophysical Profile:     Nonstress Test Interpretation: reactive      Overall Impression:  Reassuring  Post-procedure:     Patient tolerance:  Patient tolerated the procedure well with no immediate complications    7/26/2023

## 2023-07-26 NOTE — HOSPITAL COURSE
NST reactive and  reassuring  No contractions noted on toco  Speculum exam: small amount of white discharge noted, wet prep +BV and yeast, VE 1/50/-3 (states she was 1cm on her last exam)  GBS collected  UA  ordered, patient does not want to wait for results, ready to go home.  Will give diflucan prior to discharge and send flagyl RX to pharmacy  D/c home    Message sent to staff to get her scheduled next week with US to f/u polyhydramnios and growth

## 2023-07-26 NOTE — SUBJECTIVE & OBJECTIVE
Obstetric HPI:  This pregnancy has been complicated by   Asthma  AMA  Anxiety/depression  Marginal insertion of umbilical cord  Polyhydramnios  Anemia  Hx UTI    OB History    Para Term  AB Living   2 1 1 0 0 1   SAB IAB Ectopic Multiple Live Births   0 0 0 0 1      # Outcome Date GA Lbr Josue/2nd Weight Sex Delivery Anes PTL Lv   2 Current            1 Term 11 38w0d  2.863 kg (6 lb 5 oz) M Vag-Spont EPI  DARRELL      Name: Amandeep     Past Medical History:   Diagnosis Date    Mild intermittent asthma, uncomplicated      History reviewed. No pertinent surgical history.    PTA Medications   Medication Sig    albuterol (PROVENTIL) 2.5 mg /3 mL (0.083 %) nebulizer solution Inhale 2.5 mg into the lungs every 6 (six) hours as needed.    albuterol (PROVENTIL/VENTOLIN HFA) 90 mcg/actuation inhaler 2 puffs every 4 (four) hours as needed.    aspirin (ECOTRIN) 81 MG EC tablet Take 1 tablet (81 mg total) by mouth once daily.    cetirizine (ZYRTEC) 10 MG tablet Take 10 mg by mouth once daily. As needed    ondansetron (ZOFRAN-ODT) 4 MG TbDL Take 1 tablet (4 mg total) by mouth 2 (two) times daily.    promethazine (PHENERGAN) 12.5 MG Tab Take 1 tablet (12.5 mg total) by mouth every 6 (six) hours as needed (Nausea).       Review of patient's allergies indicates:   Allergen Reactions    Clindamycin Anaphylaxis    Shellfish containing products Shortness Of Breath    Cherry flavor     Penicillins         Family History       Problem Relation (Age of Onset)    Diabetes Mother    Hypertension Mother          Tobacco Use    Smoking status: Former     Types: Cigarettes    Smokeless tobacco: Never   Substance and Sexual Activity    Alcohol use: Not Currently    Drug use: Not Currently    Sexual activity: Yes     Partners: Male     Review of Systems   Gastrointestinal:  Positive for abdominal pain.   Genitourinary:  Positive for pelvic pain (pressure). Negative for vaginal bleeding and vaginal discharge.   All other systems  reviewed and are negative.   Objective:     Vital Signs (Most Recent):  Pulse: 95 (07/26/23 1800)  BP: 116/78 (07/26/23 1800)  SpO2: 100 % (07/26/23 1800) Vital Signs (24h Range):  Pulse:  [] 95  SpO2:  [99 %-100 %] 100 %  BP: (100-116)/(62-78) 116/78        There is no height or weight on file to calculate BMI.    FHT: 150 Cat 1 (reassuring)  TOCO:  none     Physical Exam:   Constitutional: She is oriented to person, place, and time. Vital signs are normal. She appears well-developed and well-nourished. She is cooperative.    HENT:   Head: Normocephalic.      Cardiovascular:  Normal rate, regular rhythm and normal heart sounds.             Pulmonary/Chest: Effort normal.        Abdominal: Soft.   Gravid, non-tender     Genitourinary:    Uterus normal.   There is vaginal discharge (small amount of white discharge noted, wet prep +clue  cells and yeast) in the vagina.           Musculoskeletal: Normal range of motion and moves all extremeties.       Neurological: She is alert and oriented to person, place, and time. She has normal strength.    Skin: Skin is warm and dry. Capillary refill takes less than 2 seconds.    Psychiatric: She has a normal mood and affect. Her speech is normal and behavior is normal. Judgment and thought content normal.      Cervix:  Dilation:  1  Effacement:  50%  Station: -3  Presentation: Unable to assess     Significant Labs:  Lab Results   Component Value Date    GROUPTRH O POS 06/08/2023    HEPBSAG Non-reactive 06/08/2023       I have personallly reviewed all pertinent lab results from the last 24 hours.  Recent Lab Results       None

## 2023-07-26 NOTE — ASSESSMENT & PLAN NOTE
NST reactive and  reassuring  No contractions noted on toco  Speculum exam: small amount of white discharge noted, wet prep +BV and yeast, VE 1/50/-3 (states she was 1cm on her last exam)  UA  ordered, patient does not want to wait for results, ready to go home.  Will give diflucan prior to discharge and send flagyl RX to pharmacy

## 2023-07-26 NOTE — DISCHARGE SUMMARY
[facility] - Labor & Delivery  Obstetrics  Discharge Summary      Patient Name: Carolyn Mcfarlane  MRN: [id number]  Admission Date: 2023  Hospital Length of Stay: 0 days  Discharge Date and Time:  2023 6:38 PM  Attending Physician: Ben Francis MD   Discharging Provider: Courtney Mccall CNM   Primary Care Provider: Sandhya Ramon NP    HPI: 39yo  EGA 35w2d presents to  with c/o abdominal and pelvic pressure for the past 24 hours. Denies contractions, VB,  LOF. Reports good fetal movement.       FHT: 150 Cat 1 (reassuring)  TOCO:  none    * No surgery found *     Hospital Course:   NST reactive and  reassuring  No contractions noted on toco  Speculum exam: small amount of white discharge noted, wet prep +BV and yeast, VE 1/50/-3 (states she was 1cm on her last exam)  GBS collected  UA  ordered, patient does not want to wait for results, ready to go home.  Will give diflucan prior to discharge and send flagyl RX to pharmacy  D/c home    Message sent to staff to get her scheduled next week with US to f/u polyhydramnios and growth           Final Active Diagnoses:    Diagnosis Date Noted POA    PRINCIPAL PROBLEM:  BV (bacterial vaginosis) [N76.0, B96.89] 2023 Yes    Yeast infection of the vagina [B37.31] 2023 Yes      Problems Resolved During this Admission:        Significant Diagnostic Studies: Labs: All labs within the past 24 hours have been reviewed    Immunizations     None          This patient has no babies on file.  Pending Diagnostic Studies:     Procedure Component Value Units Date/Time    Urinalysis, Reflex to Urine Culture Urine, Clean Catch [[id number]] Collected: 23    Order Status: Sent Lab Status: In process Updated: 23    Specimen: Urine           Discharged Condition: good    Disposition: Home or Self Care    Follow Up:   Follow-up Information     Courtney Mccall CNM Follow up in 1 week(s).    Specialty: Obstetrics and Gynecology  Why: routine  prenatal visit/Ultrasound  Contact information:  [address] [place] [address] [address] [place] [address]  [phone]                       Patient Instructions:      Diet Adult Regular     Notify your health care provider if you experience any of the following:  temperature >100.4     Notify your health care provider if you experience any of the following:  persistent nausea and vomiting or diarrhea     Notify your health care provider if you experience any of the following:  severe uncontrolled pain     Notify your health care provider if you experience any of the following:  difficulty breathing or increased cough     Notify your health care provider if you experience any of the following:  severe persistent headache     Notify your health care provider if you experience any of the following:  worsening rash     Notify your health care provider if you experience any of the following:  persistent dizziness, light-headedness, or visual disturbances     Notify your health care provider if you experience any of the following:  increased confusion or weakness     Activity as tolerated     Medications:  Current Discharge Medication List      START taking these medications    Details   metroNIDAZOLE (FLAGYL) 500 MG tablet Take 1 tablet (500 mg total) by mouth 2 (two) times a day. for 7 days  Qty: 14 tablet, Refills: 0         CONTINUE these medications which have NOT CHANGED    Details   albuterol (PROVENTIL) 2.5 mg /3 mL (0.083 %) nebulizer solution Inhale 2.5 mg into the lungs every 6 (six) hours as needed.      albuterol (PROVENTIL/VENTOLIN HFA) 90 mcg/actuation inhaler 2 puffs every 4 (four) hours as needed.      aspirin (ECOTRIN) 81 MG EC tablet Take 1 tablet (81 mg total) by mouth once daily.  Refills: 0    Associated Diagnoses: Supervision of high risk pregnancy in second trimester      cetirizine (ZYRTEC) 10 MG tablet Take 10 mg by mouth once daily. As needed      ondansetron (ZOFRAN-ODT) 4 MG TbDL Take 1 tablet (4 mg  total) by mouth 2 (two) times daily.  Qty: 30 tablet, Refills: 0      promethazine (PHENERGAN) 12.5 MG Tab Take 1 tablet (12.5 mg total) by mouth every 6 (six) hours as needed (Nausea).  Qty: 30 tablet, Refills: 0             Courtney Mccall CNM  Obstetrics  [facility] - Labor & Delivery

## 2023-07-27 ENCOUNTER — TELEPHONE (OUTPATIENT)
Dept: OBSTETRICS AND GYNECOLOGY | Facility: CLINIC | Age: 38
End: 2023-07-27
Payer: MEDICAID

## 2023-07-27 NOTE — TELEPHONE ENCOUNTER
----- Message from Courtney Mccall CNM sent at 7/26/2023  6:39 PM CDT -----  Regarding: needs appt/US  Saw patient on LD, hasn't been seen since 31 weeks. Needs visit and US for BPP/growth next week. Please schedule.    Thanks,  Courtney

## 2023-07-28 LAB — BACTERIA UR CULT: NO GROWTH

## 2023-07-29 LAB — BACTERIA SPEC AEROBE CULT: ABNORMAL

## 2023-08-12 ENCOUNTER — HOSPITAL ENCOUNTER (OUTPATIENT)
Facility: HOSPITAL | Age: 38
Discharge: HOME | End: 2023-08-13
Attending: OBSTETRICS & GYNECOLOGY | Admitting: ADVANCED PRACTICE MIDWIFE
Payer: MEDICAID

## 2023-08-12 DIAGNOSIS — N94.89 UTERINE CRAMPING: Primary | ICD-10-CM

## 2023-08-13 VITALS
TEMPERATURE: 98 F | RESPIRATION RATE: 18 BRPM | OXYGEN SATURATION: 99 % | HEART RATE: 95 BPM | DIASTOLIC BLOOD PRESSURE: 71 MMHG | SYSTOLIC BLOOD PRESSURE: 127 MMHG

## 2023-08-13 DIAGNOSIS — O09.523 AMA (ADVANCED MATERNAL AGE) MULTIGRAVIDA 35+, THIRD TRIMESTER: Primary | ICD-10-CM

## 2023-08-13 DIAGNOSIS — O43.192 MARGINAL INSERTION OF UMBILICAL CORD AFFECTING MANAGEMENT OF MOTHER IN SECOND TRIMESTER: ICD-10-CM

## 2023-08-13 DIAGNOSIS — Z34.90 ENCOUNTER FOR ELECTIVE INDUCTION OF LABOR: Primary | ICD-10-CM

## 2023-08-13 DIAGNOSIS — O40.3XX0 POLYHYDRAMNIOS IN THIRD TRIMESTER COMPLICATION, SINGLE OR UNSPECIFIED FETUS: ICD-10-CM

## 2023-08-13 PROBLEM — N94.89 UTERINE CRAMPING: Status: ACTIVE | Noted: 2023-08-13

## 2023-08-13 LAB
BACTERIA #/AREA URNS HPF: NORMAL /HPF
BASOPHILS # BLD AUTO: 0.08 K/UL (ref 0–0.2)
BASOPHILS NFR BLD: 0.5 % (ref 0–1.9)
BILIRUB UR QL STRIP: NEGATIVE
CLARITY UR: CLEAR
COLOR UR: YELLOW
DIFFERENTIAL METHOD: ABNORMAL
EOSINOPHIL # BLD AUTO: 0.1 K/UL (ref 0–0.5)
EOSINOPHIL NFR BLD: 0.8 % (ref 0–8)
ERYTHROCYTE [DISTWIDTH] IN BLOOD BY AUTOMATED COUNT: 16.6 % (ref 11.5–14.5)
GLUCOSE UR QL STRIP: NEGATIVE
HCT VFR BLD AUTO: 27.3 % (ref 37–48.5)
HGB BLD-MCNC: 8.8 G/DL (ref 12–16)
HGB UR QL STRIP: NEGATIVE
HIV 1+2 AB+HIV1 P24 AG SERPL QL IA: NEGATIVE
HYALINE CASTS #/AREA URNS LPF: 0 /LPF
IMM GRANULOCYTES # BLD AUTO: 0.4 K/UL (ref 0–0.04)
IMM GRANULOCYTES NFR BLD AUTO: 2.7 % (ref 0–0.5)
KETONES UR QL STRIP: NEGATIVE
LEUKOCYTE ESTERASE UR QL STRIP: ABNORMAL
LYMPHOCYTES # BLD AUTO: 2.8 K/UL (ref 1–4.8)
LYMPHOCYTES NFR BLD: 18.6 % (ref 18–48)
MCH RBC QN AUTO: 24 PG (ref 27–31)
MCHC RBC AUTO-ENTMCNC: 32.2 G/DL (ref 32–36)
MCV RBC AUTO: 75 FL (ref 82–98)
MICROSCOPIC COMMENT: NORMAL
MONOCYTES # BLD AUTO: 1.4 K/UL (ref 0.3–1)
MONOCYTES NFR BLD: 9.2 % (ref 4–15)
NEUTROPHILS # BLD AUTO: 10.1 K/UL (ref 1.8–7.7)
NEUTROPHILS NFR BLD: 68.2 % (ref 38–73)
NITRITE UR QL STRIP: NEGATIVE
NRBC BLD-RTO: 1 /100 WBC
PH UR STRIP: 7 [PH] (ref 5–8)
PLATELET # BLD AUTO: 299 K/UL (ref 150–450)
PMV BLD AUTO: 11.3 FL (ref 9.2–12.9)
PROT UR QL STRIP: ABNORMAL
RBC # BLD AUTO: 3.66 M/UL (ref 4–5.4)
RBC #/AREA URNS HPF: 1 /HPF (ref 0–4)
RPR SER QL: NORMAL
SP GR UR STRIP: >1.03 (ref 1–1.03)
SQUAMOUS #/AREA URNS HPF: 1 /HPF
URN SPEC COLLECT METH UR: ABNORMAL
UROBILINOGEN UR STRIP-ACNC: NEGATIVE EU/DL
WBC # BLD AUTO: 14.78 K/UL (ref 3.9–12.7)
WBC #/AREA URNS HPF: 4 /HPF (ref 0–5)

## 2023-08-13 PROCEDURE — 86592 SYPHILIS TEST NON-TREP QUAL: CPT | Performed by: ADVANCED PRACTICE MIDWIFE

## 2023-08-13 PROCEDURE — 59025 FETAL NON-STRESS TEST: CPT | Mod: 26,,, | Performed by: ADVANCED PRACTICE MIDWIFE

## 2023-08-13 PROCEDURE — 87389 HIV-1 AG W/HIV-1&-2 AB AG IA: CPT | Performed by: ADVANCED PRACTICE MIDWIFE

## 2023-08-13 PROCEDURE — 59025 OBTAIN FETAL NONSTRESS TEST (NST): ICD-10-PCS | Mod: 26,,, | Performed by: ADVANCED PRACTICE MIDWIFE

## 2023-08-13 PROCEDURE — 99214 PR OFFICE/OUTPT VISIT, EST, LEVL IV, 30-39 MIN: ICD-10-PCS | Mod: 25,TH,, | Performed by: ADVANCED PRACTICE MIDWIFE

## 2023-08-13 PROCEDURE — 85025 COMPLETE CBC W/AUTO DIFF WBC: CPT | Performed by: ADVANCED PRACTICE MIDWIFE

## 2023-08-13 PROCEDURE — 81000 URINALYSIS NONAUTO W/SCOPE: CPT | Performed by: ADVANCED PRACTICE MIDWIFE

## 2023-08-13 PROCEDURE — 99211 OFF/OP EST MAY X REQ PHY/QHP: CPT | Mod: 25

## 2023-08-13 PROCEDURE — 59025 FETAL NON-STRESS TEST: CPT

## 2023-08-13 PROCEDURE — 25000003 PHARM REV CODE 250: Performed by: ADVANCED PRACTICE MIDWIFE

## 2023-08-13 PROCEDURE — 99214 OFFICE O/P EST MOD 30 MIN: CPT | Mod: 25,TH,, | Performed by: ADVANCED PRACTICE MIDWIFE

## 2023-08-13 RX ORDER — CALCIUM CARBONATE 200(500)MG
500 TABLET,CHEWABLE ORAL ONCE
Status: COMPLETED | OUTPATIENT
Start: 2023-08-13 | End: 2023-08-13

## 2023-08-13 RX ORDER — ONDANSETRON 8 MG/1
8 TABLET, ORALLY DISINTEGRATING ORAL EVERY 8 HOURS PRN
Status: DISCONTINUED | OUTPATIENT
Start: 2023-08-13 | End: 2023-08-13 | Stop reason: HOSPADM

## 2023-08-13 RX ORDER — CYCLOBENZAPRINE HCL 10 MG
10 TABLET ORAL 3 TIMES DAILY PRN
Status: DISCONTINUED | OUTPATIENT
Start: 2023-08-13 | End: 2023-08-13 | Stop reason: HOSPADM

## 2023-08-13 RX ORDER — PROCHLORPERAZINE EDISYLATE 5 MG/ML
5 INJECTION INTRAMUSCULAR; INTRAVENOUS EVERY 6 HOURS PRN
Status: DISCONTINUED | OUTPATIENT
Start: 2023-08-13 | End: 2023-08-13 | Stop reason: HOSPADM

## 2023-08-13 RX ORDER — ACETAMINOPHEN 500 MG
500 TABLET ORAL EVERY 6 HOURS PRN
Status: DISCONTINUED | OUTPATIENT
Start: 2023-08-13 | End: 2023-08-13 | Stop reason: HOSPADM

## 2023-08-13 RX ORDER — SODIUM CHLORIDE, SODIUM LACTATE, POTASSIUM CHLORIDE, CALCIUM CHLORIDE 600; 310; 30; 20 MG/100ML; MG/100ML; MG/100ML; MG/100ML
INJECTION, SOLUTION INTRAVENOUS CONTINUOUS
Status: DISCONTINUED | OUTPATIENT
Start: 2023-08-13 | End: 2023-08-13 | Stop reason: HOSPADM

## 2023-08-13 RX ADMIN — CALCIUM CARBONATE (ANTACID) CHEW TAB 500 MG 500 MG: 500 CHEW TAB at 01:08

## 2023-08-13 RX ADMIN — CYCLOBENZAPRINE 10 MG: 10 TABLET, FILM COATED ORAL at 12:08

## 2023-08-13 NOTE — DISCHARGE SUMMARY
"[facility] - [facility]  Obstetrics  Discharge Summary      Patient Name: Carolyn Mcfarlane  MRN: [id number]  Admission Date: 8/12/2023  Hospital Length of Stay: 0 days  Discharge Date and Time:  08/13/2023 4:42 AM  Attending Physician: Valeri Noel MD   Discharging Provider: Clovis Owens CNM   Primary Care Provider: No primary care provider on file.    HPI: Presents to L&D from [facility] ER d/t lower uterine pain/vaginal pain, reports "uncomfortable in pregnancy", requesting IOL      FHT: Cat 1 (reassuring)  TOCO: None    * No surgery found *     Hospital Course:   Observation   UA Negative  EIOL set up for next Tuesday 8/22/23, u/s and visit set up this Wednesday   RNST  IV Hydration  Discharge instructions reviewed including stressed hydration, labor precautions, normal fetal movement, when to return to hospital.           Final Active Diagnoses:    Diagnosis Date Noted POA    PRINCIPAL PROBLEM:  Uterine cramping [N94.89] 08/13/2023 Yes      Problems Resolved During this Admission:        Significant Diagnostic Studies: Labs: All labs within the past 24 hours have been reviewed    Immunizations     None          This patient has no babies on file.  Pending Diagnostic Studies:     None          Discharged Condition: stable    Disposition: Home or Self Care    Follow Up:    Patient Instructions:      Notify your health care provider if you experience any of the following:  temperature >100.4     Notify your health care provider if you experience any of the following:  persistent nausea and vomiting or diarrhea     Notify your health care provider if you experience any of the following:  severe uncontrolled pain     Notify your health care provider if you experience any of the following:  difficulty breathing or increased cough     Notify your health care provider if you experience any of the following:  severe persistent headache     Notify your health care provider if you experience any of the following:  persistent " dizziness, light-headedness, or visual disturbances     Notify your health care provider if you experience any of the following:  increased confusion or weakness     Medications:  Current Discharge Medication List      CONTINUE these medications which have NOT CHANGED    Details   albuterol (PROVENTIL) 2.5 mg /3 mL (0.083 %) nebulizer solution Inhale 2.5 mg into the lungs every 6 (six) hours as needed.      albuterol (PROVENTIL/VENTOLIN HFA) 90 mcg/actuation inhaler 2 puffs every 4 (four) hours as needed.      aspirin (ECOTRIN) 81 MG EC tablet Take 1 tablet (81 mg total) by mouth once daily.  Refills: 0    Associated Diagnoses: Supervision of high risk pregnancy in second trimester      cetirizine (ZYRTEC) 10 MG tablet Take 10 mg by mouth once daily. As needed      ondansetron (ZOFRAN-ODT) 4 MG TbDL Take 1 tablet (4 mg total) by mouth 2 (two) times daily.  Qty: 30 tablet, Refills: 0      promethazine (PHENERGAN) 12.5 MG Tab Take 1 tablet (12.5 mg total) by mouth every 6 (six) hours as needed (Nausea).  Qty: 30 tablet, Refills: 0             Clovis Owens CNM  Obstetrics  [facility]

## 2023-08-13 NOTE — SUBJECTIVE & OBJECTIVE
Obstetric HPI:  Patient reports None contractions, active fetal movement, No vaginal bleeding , No loss of fluid     This pregnancy has been complicated by   AMA  Asthma  Depression  Marginal Insertion   Polyhydramnios  Anemia    OB History    Para Term  AB Living   2 1 1 0 0 1   SAB IAB Ectopic Multiple Live Births   0 0 0 0 1      # Outcome Date GA Lbr Josue/2nd Weight Sex Delivery Anes PTL Lv   2 Current            1 Term 11 38w0d  2.863 kg (6 lb 5 oz) M Vag-Spont EPI  DARRELL      Name: Amandeep     Past Medical History:   Diagnosis Date    Mild intermittent asthma, uncomplicated      History reviewed. No pertinent surgical history.    PTA Medications   Medication Sig    albuterol (PROVENTIL) 2.5 mg /3 mL (0.083 %) nebulizer solution Inhale 2.5 mg into the lungs every 6 (six) hours as needed.    albuterol (PROVENTIL/VENTOLIN HFA) 90 mcg/actuation inhaler 2 puffs every 4 (four) hours as needed.    aspirin (ECOTRIN) 81 MG EC tablet Take 1 tablet (81 mg total) by mouth once daily.    cetirizine (ZYRTEC) 10 MG tablet Take 10 mg by mouth once daily. As needed    ondansetron (ZOFRAN-ODT) 4 MG TbDL Take 1 tablet (4 mg total) by mouth 2 (two) times daily.    promethazine (PHENERGAN) 12.5 MG Tab Take 1 tablet (12.5 mg total) by mouth every 6 (six) hours as needed (Nausea).       Review of patient's allergies indicates:   Allergen Reactions    Clindamycin Anaphylaxis    Shellfish containing products Shortness Of Breath    Cherry flavor     Penicillins         Family History       Problem Relation (Age of Onset)    Diabetes Mother    Hypertension Mother          Tobacco Use    Smoking status: Former     Current packs/day: 0.00     Types: Cigarettes    Smokeless tobacco: Never   Substance and Sexual Activity    Alcohol use: Not Currently    Drug use: Not Currently    Sexual activity: Yes     Partners: Male     Review of Systems   Gastrointestinal:  Positive for abdominal pain (Lower).   Genitourinary:   Negative for vaginal bleeding and vaginal discharge.   All other systems reviewed and are negative.     Objective:     Vital Signs (Most Recent):  Temp: 97.9 °F (36.6 °C) (08/12/23 2350)  Pulse: 95 (08/13/23 0000)  Resp: 18 (08/12/23 2350)  BP: 127/71 (08/12/23 2350)  SpO2: 99 % (08/13/23 0000) Vital Signs (24h Range):  Temp:  [97.9 °F (36.6 °C)] 97.9 °F (36.6 °C)  Pulse:  [94-95] 95  Resp:  [18] 18  SpO2:  [99 %] 99 %  BP: (127)/(71) 127/71        There is no height or weight on file to calculate BMI.    FHT: Cat 1 (reassuring)  TOCO:  None     Physical Exam:   Constitutional: She is oriented to person, place, and time. She appears well-developed and well-nourished.    HENT:   Head: Normocephalic.      Cardiovascular:  Normal rate.             Pulmonary/Chest: Effort normal.        Abdominal: Soft. There is no abdominal tenderness.             Musculoskeletal: Normal range of motion and moves all extremeties.       Neurological: She is alert and oriented to person, place, and time.    Skin: Skin is warm and dry.    Psychiatric: She has a normal mood and affect. Her behavior is normal. Judgment and thought content normal.        Cervix: per RN  Dilation:  1  Effacement:  50%  Station: -3  Presentation: Vertex     Significant Labs:  Lab Results   Component Value Date    GROUPTRH O POS 06/08/2023    HEPBSAG Non-reactive 06/08/2023    STREPBCULT (A) 07/26/2023     STREPTOCOCCUS AGALACTIAE (GROUP B)  In case of Penicillin allergy, call lab for further testing.  Beta-hemolytic streptococci are routinely susceptible to   penicillins,cephalosporins and carbapenems.         I have personallly reviewed all pertinent lab results from the last 24 hours.

## 2023-08-13 NOTE — DISCHARGE INSTRUCTIONS

## 2023-08-13 NOTE — HPI
"Presents to L&D from [facility] ER d/t lower uterine pain/vaginal pain, reports "uncomfortable in pregnancy", requesting IOL  "

## 2023-08-13 NOTE — H&P
"[facility] - [facility]  Obstetrics  History & Physical    Patient Name: Carolyn Mcfarlane  MRN: [id number]  Admission Date: 2023  Primary Care Provider: No primary care provider on file.    Subjective:     Principal Problem:Uterine cramping    History of Present Illness:  Presents to L&D from [facility] ER d/t lower uterine pain/vaginal pain, reports "uncomfortable in pregnancy", requesting IOL      Obstetric HPI:  Patient reports None contractions, active fetal movement, No vaginal bleeding , No loss of fluid     This pregnancy has been complicated by   AMA  Asthma  Depression  Marginal Insertion   Polyhydramnios  Anemia    OB History    Para Term  AB Living   2 1 1 0 0 1   SAB IAB Ectopic Multiple Live Births   0 0 0 0 1      # Outcome Date GA Lbr Josue/2nd Weight Sex Delivery Anes PTL Lv   2 Current            1 Term 11 38w0d  2.863 kg (6 lb 5 oz) M Vag-Spont EPI  DARRELL      Name: Amandeep     Past Medical History:   Diagnosis Date    Mild intermittent asthma, uncomplicated      History reviewed. No pertinent surgical history.    PTA Medications   Medication Sig    albuterol (PROVENTIL) 2.5 mg /3 mL (0.083 %) nebulizer solution Inhale 2.5 mg into the lungs every 6 (six) hours as needed.    albuterol (PROVENTIL/VENTOLIN HFA) 90 mcg/actuation inhaler 2 puffs every 4 (four) hours as needed.    aspirin (ECOTRIN) 81 MG EC tablet Take 1 tablet (81 mg total) by mouth once daily.    cetirizine (ZYRTEC) 10 MG tablet Take 10 mg by mouth once daily. As needed    ondansetron (ZOFRAN-ODT) 4 MG TbDL Take 1 tablet (4 mg total) by mouth 2 (two) times daily.    promethazine (PHENERGAN) 12.5 MG Tab Take 1 tablet (12.5 mg total) by mouth every 6 (six) hours as needed (Nausea).       Review of patient's allergies indicates:   Allergen Reactions    Clindamycin Anaphylaxis    Shellfish containing products Shortness Of Breath    Cherry flavor     Penicillins         Family History       Problem Relation (Age of " Onset)    Diabetes Mother    Hypertension Mother          Tobacco Use    Smoking status: Former     Current packs/day: 0.00     Types: Cigarettes    Smokeless tobacco: Never   Substance and Sexual Activity    Alcohol use: Not Currently    Drug use: Not Currently    Sexual activity: Yes     Partners: Male     Review of Systems   Gastrointestinal:  Positive for abdominal pain (Lower).   Genitourinary:  Negative for vaginal bleeding and vaginal discharge.   All other systems reviewed and are negative.     Objective:     Vital Signs (Most Recent):  Temp: 97.9 °F (36.6 °C) (08/12/23 2350)  Pulse: 95 (08/13/23 0000)  Resp: 18 (08/12/23 2350)  BP: 127/71 (08/12/23 2350)  SpO2: 99 % (08/13/23 0000) Vital Signs (24h Range):  Temp:  [97.9 °F (36.6 °C)] 97.9 °F (36.6 °C)  Pulse:  [94-95] 95  Resp:  [18] 18  SpO2:  [99 %] 99 %  BP: (127)/(71) 127/71        There is no height or weight on file to calculate BMI.    FHT: Cat 1 (reassuring)  TOCO:  None     Physical Exam:   Constitutional: She is oriented to person, place, and time. She appears well-developed and well-nourished.    HENT:   Head: Normocephalic.      Cardiovascular:  Normal rate.             Pulmonary/Chest: Effort normal.        Abdominal: Soft. There is no abdominal tenderness.             Musculoskeletal: Normal range of motion and moves all extremeties.       Neurological: She is alert and oriented to person, place, and time.    Skin: Skin is warm and dry.    Psychiatric: She has a normal mood and affect. Her behavior is normal. Judgment and thought content normal.        Cervix: per RN  Dilation:  1  Effacement:  50%  Station: -3  Presentation: Vertex     Significant Labs:  Lab Results   Component Value Date    GROUPTRH O POS 06/08/2023    HEPBSAG Non-reactive 06/08/2023    STREPBCULT (A) 07/26/2023     STREPTOCOCCUS AGALACTIAE (GROUP B)  In case of Penicillin allergy, call lab for further testing.  Beta-hemolytic streptococci are routinely susceptible to    penicillins,cephalosporins and carbapenems.         I have personallly reviewed all pertinent lab results from the last 24 hours.    Assessment/Plan:     38 y.o. female  at 37w6d for:    * Uterine cramping  Observation   UA  EIOL set up for next 23, u/s and visit set up this Wednesday   NST  IV Hydration        Clovis Owens, BOB  Obstetrics  [facility]

## 2023-08-13 NOTE — ASSESSMENT & PLAN NOTE
Observation   UA  EIOL set up for next Tuesday 8/22/23, u/s and visit set up this Wednesday   NST  IV Hydration

## 2023-08-13 NOTE — HOSPITAL COURSE
Observation   UA Negative  EIOL set up for next Tuesday 8/22/23, u/s and visit set up this Wednesday   RNST  IV Hydration  Discharge instructions reviewed including stressed hydration, labor precautions, normal fetal movement, when to return to hospital.

## 2023-08-13 NOTE — PROCEDURES
Carolyn Mcfarlane is a 38 y.o. female patient.    Temp: 97.9 °F (36.6 °C) (08/12/23 2350)  Pulse: 95 (08/13/23 0000)  Resp: 18 (08/12/23 2350)  BP: 127/71 (08/12/23 2350)  SpO2: 99 % (08/13/23 0000)       Obtain Fetal nonstress test (NST)    Date/Time: 8/13/2023 4:36 AM    Performed by: Clovis Owens CNM  Authorized by: Colvis Owens CNM    Nonstress Test:     Variability:  6-25 BPM    Decelerations:  None    Accelerations:  15 bpm    Acoustic Stimulator: No      Uterine Irritability: No      Contractions:  Not present  Biophysical Profile:     Nonstress Test Interpretation: reactive      Overall Impression:  Reassuring  Post-procedure:     Patient tolerance:  Patient tolerated the procedure well with no immediate complications    8/13/2023

## 2023-08-16 ENCOUNTER — ROUTINE PRENATAL (OUTPATIENT)
Dept: OBSTETRICS AND GYNECOLOGY | Facility: CLINIC | Age: 38
End: 2023-08-16
Payer: MEDICAID

## 2023-08-16 ENCOUNTER — PROCEDURE VISIT (OUTPATIENT)
Dept: OBSTETRICS AND GYNECOLOGY | Facility: CLINIC | Age: 38
End: 2023-08-16
Payer: MEDICAID

## 2023-08-16 VITALS
DIASTOLIC BLOOD PRESSURE: 68 MMHG | SYSTOLIC BLOOD PRESSURE: 118 MMHG | WEIGHT: 172.19 LBS | BODY MASS INDEX: 33.63 KG/M2

## 2023-08-16 DIAGNOSIS — O99.013 ANEMIA OF MOTHER IN PREGNANCY, ANTEPARTUM, THIRD TRIMESTER: ICD-10-CM

## 2023-08-16 DIAGNOSIS — O99.512 ASTHMA AFFECTING PREGNANCY IN SECOND TRIMESTER: ICD-10-CM

## 2023-08-16 DIAGNOSIS — O09.523 AMA (ADVANCED MATERNAL AGE) MULTIGRAVIDA 35+, THIRD TRIMESTER: Primary | ICD-10-CM

## 2023-08-16 DIAGNOSIS — O40.3XX0 POLYHYDRAMNIOS IN THIRD TRIMESTER COMPLICATION, SINGLE OR UNSPECIFIED FETUS: ICD-10-CM

## 2023-08-16 DIAGNOSIS — O09.523 AMA (ADVANCED MATERNAL AGE) MULTIGRAVIDA 35+, THIRD TRIMESTER: ICD-10-CM

## 2023-08-16 DIAGNOSIS — O43.192 MARGINAL INSERTION OF UMBILICAL CORD AFFECTING MANAGEMENT OF MOTHER IN SECOND TRIMESTER: ICD-10-CM

## 2023-08-16 DIAGNOSIS — J45.909 ASTHMA AFFECTING PREGNANCY IN SECOND TRIMESTER: ICD-10-CM

## 2023-08-16 DIAGNOSIS — O99.820 GBS (GROUP B STREPTOCOCCUS CARRIER), +RV CULTURE, CURRENTLY PREGNANT: ICD-10-CM

## 2023-08-16 PROCEDURE — 99214 PR OFFICE/OUTPT VISIT, EST, LEVL IV, 30-39 MIN: ICD-10-PCS | Mod: TH,S$PBB,, | Performed by: ADVANCED PRACTICE MIDWIFE

## 2023-08-16 PROCEDURE — 76816 OB US FOLLOW-UP PER FETUS: CPT | Mod: PBBFAC,PO | Performed by: OBSTETRICS & GYNECOLOGY

## 2023-08-16 PROCEDURE — 99214 OFFICE O/P EST MOD 30 MIN: CPT | Mod: TH,S$PBB,, | Performed by: ADVANCED PRACTICE MIDWIFE

## 2023-08-16 PROCEDURE — 76816 US OB/GYN PROCEDURE (VIEWPOINT): ICD-10-PCS | Mod: 26,S$PBB,, | Performed by: OBSTETRICS & GYNECOLOGY

## 2023-08-16 PROCEDURE — 99999 PR PBB SHADOW E&M-EST. PATIENT-LVL II: CPT | Mod: PBBFAC,,, | Performed by: ADVANCED PRACTICE MIDWIFE

## 2023-08-16 PROCEDURE — 99212 OFFICE O/P EST SF 10 MIN: CPT | Mod: PBBFAC,TH,PO | Performed by: ADVANCED PRACTICE MIDWIFE

## 2023-08-16 PROCEDURE — 99999 PR PBB SHADOW E&M-EST. PATIENT-LVL II: ICD-10-PCS | Mod: PBBFAC,,, | Performed by: ADVANCED PRACTICE MIDWIFE

## 2023-08-16 RX ORDER — MISOPROSTOL 100 UG/1
800 TABLET ORAL
Status: CANCELLED | OUTPATIENT
Start: 2023-08-16

## 2023-08-16 RX ORDER — MISOPROSTOL 100 UG/1
800 TABLET ORAL ONCE AS NEEDED
Status: CANCELLED | OUTPATIENT
Start: 2023-08-16

## 2023-08-16 RX ORDER — DIPHENOXYLATE HYDROCHLORIDE AND ATROPINE SULFATE 2.5; .025 MG/1; MG/1
1 TABLET ORAL 4 TIMES DAILY PRN
Status: CANCELLED | OUTPATIENT
Start: 2023-08-16

## 2023-08-16 RX ORDER — LIDOCAINE HYDROCHLORIDE 10 MG/ML
10 INJECTION INFILTRATION; PERINEURAL ONCE AS NEEDED
Status: CANCELLED | OUTPATIENT
Start: 2023-08-16 | End: 2035-01-12

## 2023-08-16 RX ORDER — SODIUM CHLORIDE, SODIUM LACTATE, POTASSIUM CHLORIDE, CALCIUM CHLORIDE 600; 310; 30; 20 MG/100ML; MG/100ML; MG/100ML; MG/100ML
INJECTION, SOLUTION INTRAVENOUS CONTINUOUS
Status: CANCELLED | OUTPATIENT
Start: 2023-08-16

## 2023-08-16 RX ORDER — TERBUTALINE SULFATE 1 MG/ML
0.25 INJECTION SUBCUTANEOUS
Status: CANCELLED | OUTPATIENT
Start: 2023-08-16

## 2023-08-16 RX ORDER — MUPIROCIN 20 MG/G
OINTMENT TOPICAL
Status: CANCELLED | OUTPATIENT
Start: 2023-08-16

## 2023-08-16 RX ORDER — MISOPROSTOL 100 MCG
50 TABLET ORAL EVERY 4 HOURS PRN
Status: CANCELLED | OUTPATIENT
Start: 2023-08-16

## 2023-08-16 RX ORDER — OXYTOCIN/RINGER'S LACTATE 30/500 ML
95 PLASTIC BAG, INJECTION (ML) INTRAVENOUS ONCE AS NEEDED
Status: CANCELLED | OUTPATIENT
Start: 2023-08-16 | End: 2035-01-12

## 2023-08-16 RX ORDER — OXYTOCIN/RINGER'S LACTATE 30/500 ML
334 PLASTIC BAG, INJECTION (ML) INTRAVENOUS ONCE
Status: CANCELLED | OUTPATIENT
Start: 2023-08-16 | End: 2023-08-16

## 2023-08-16 RX ORDER — OXYTOCIN/RINGER'S LACTATE 30/500 ML
334 PLASTIC BAG, INJECTION (ML) INTRAVENOUS ONCE AS NEEDED
Status: CANCELLED | OUTPATIENT
Start: 2023-08-16 | End: 2035-01-12

## 2023-08-16 RX ORDER — CARBOPROST TROMETHAMINE 250 UG/ML
250 INJECTION, SOLUTION INTRAMUSCULAR
Status: CANCELLED | OUTPATIENT
Start: 2023-08-16

## 2023-08-16 RX ORDER — OXYTOCIN 10 [USP'U]/ML
10 INJECTION, SOLUTION INTRAMUSCULAR; INTRAVENOUS ONCE AS NEEDED
Status: CANCELLED | OUTPATIENT
Start: 2023-08-16 | End: 2035-01-12

## 2023-08-16 RX ORDER — SODIUM CHLORIDE 9 MG/ML
INJECTION, SOLUTION INTRAVENOUS
Status: CANCELLED | OUTPATIENT
Start: 2023-08-16

## 2023-08-16 RX ORDER — PROCHLORPERAZINE EDISYLATE 5 MG/ML
5 INJECTION INTRAMUSCULAR; INTRAVENOUS EVERY 6 HOURS PRN
Status: CANCELLED | OUTPATIENT
Start: 2023-08-16

## 2023-08-16 RX ORDER — METHYLERGONOVINE MALEATE 0.2 MG/ML
200 INJECTION INTRAVENOUS
Status: CANCELLED | OUTPATIENT
Start: 2023-08-16

## 2023-08-16 RX ORDER — CALCIUM CARBONATE 200(500)MG
500 TABLET,CHEWABLE ORAL 3 TIMES DAILY PRN
Status: CANCELLED | OUTPATIENT
Start: 2023-08-16

## 2023-08-16 RX ORDER — OXYTOCIN/RINGER'S LACTATE 30/500 ML
95 PLASTIC BAG, INJECTION (ML) INTRAVENOUS ONCE
Status: CANCELLED | OUTPATIENT
Start: 2023-08-16 | End: 2023-08-16

## 2023-08-16 RX ORDER — DIPHENOXYLATE HYDROCHLORIDE AND ATROPINE SULFATE 2.5; .025 MG/1; MG/1
2 TABLET ORAL EVERY 6 HOURS PRN
Status: CANCELLED | OUTPATIENT
Start: 2023-08-16

## 2023-08-16 RX ORDER — ONDANSETRON 4 MG/1
8 TABLET, ORALLY DISINTEGRATING ORAL EVERY 8 HOURS PRN
Status: CANCELLED | OUTPATIENT
Start: 2023-08-16

## 2023-08-16 RX ORDER — MISOPROSTOL 100 UG/1
800 TABLET ORAL ONCE AS NEEDED
Status: CANCELLED | OUTPATIENT
Start: 2023-08-16 | End: 2035-01-12

## 2023-08-16 RX ORDER — SIMETHICONE 80 MG
1 TABLET,CHEWABLE ORAL 4 TIMES DAILY PRN
Status: CANCELLED | OUTPATIENT
Start: 2023-08-16

## 2023-08-16 NOTE — PROGRESS NOTES
38 y.o. female  at 38w2d   Reports + FM, denies VB, LOF or regular CTX  Reports not feeling great, miserable in the pregnancy, ready for baby  /68   Wt 78.1 kg (172 lb 2.9 oz)   BMI 33.63 kg/m²   TW lbs     AMA (advanced maternal age) multigravida 35+, third trimester    Marginal insertion of umbilical cord affecting management of mother in second trimester    Anemia of mother in pregnancy, antepartum, third trimester    Asthma affecting pregnancy in second trimester    GBS +    Ultrasound today with cephalic presentation, PCI noted 2.4cm from placental edge, posterior placenta, 3VC, MEGHAN 21.1cm, MVP 6.5cm, EFW 40%, 7lb 1oz, reviewed witht  Reviewed warning signs, normal FKCs, labor precautions and how/when to call.  RTC x 1 wk, call or present sooner prn.

## 2023-08-22 ENCOUNTER — ANESTHESIA (OUTPATIENT)
Dept: OBSTETRICS AND GYNECOLOGY | Facility: HOSPITAL | Age: 38
End: 2023-08-22
Payer: MEDICAID

## 2023-08-22 ENCOUNTER — HOSPITAL ENCOUNTER (INPATIENT)
Facility: HOSPITAL | Age: 38
LOS: 3 days | Discharge: HOME | End: 2023-08-25
Attending: OBSTETRICS & GYNECOLOGY | Admitting: OBSTETRICS & GYNECOLOGY
Payer: MEDICAID

## 2023-08-22 ENCOUNTER — ANESTHESIA EVENT (OUTPATIENT)
Dept: OBSTETRICS AND GYNECOLOGY | Facility: HOSPITAL | Age: 38
End: 2023-08-22
Payer: MEDICAID

## 2023-08-22 DIAGNOSIS — Z34.90 ENCOUNTER FOR ELECTIVE INDUCTION OF LABOR: ICD-10-CM

## 2023-08-22 DIAGNOSIS — O09.523 AMA (ADVANCED MATERNAL AGE) MULTIGRAVIDA 35+, THIRD TRIMESTER: ICD-10-CM

## 2023-08-22 DIAGNOSIS — Z98.891 STATUS POST PRIMARY LOW TRANSVERSE CESAREAN SECTION: ICD-10-CM

## 2023-08-22 DIAGNOSIS — Z98.891 S/P CESAREAN SECTION: ICD-10-CM

## 2023-08-22 PROBLEM — N76.0 BV (BACTERIAL VAGINOSIS): Status: RESOLVED | Noted: 2023-07-26 | Resolved: 2023-08-22

## 2023-08-22 PROBLEM — O23.43 UTI (URINARY TRACT INFECTION) DURING PREGNANCY, THIRD TRIMESTER: Status: RESOLVED | Noted: 2023-06-28 | Resolved: 2023-08-22

## 2023-08-22 PROBLEM — B96.89 BV (BACTERIAL VAGINOSIS): Status: RESOLVED | Noted: 2023-07-26 | Resolved: 2023-08-22

## 2023-08-22 PROBLEM — N94.89 UTERINE CRAMPING: Status: RESOLVED | Noted: 2023-08-13 | Resolved: 2023-08-22

## 2023-08-22 PROBLEM — B37.31 YEAST INFECTION OF THE VAGINA: Status: RESOLVED | Noted: 2023-07-26 | Resolved: 2023-08-22

## 2023-08-22 PROBLEM — O42.90 AMNIOTIC FLUID LEAKING: Status: ACTIVE | Noted: 2023-08-22

## 2023-08-22 LAB
ABO + RH BLD: NORMAL
ALBUMIN SERPL BCP-MCNC: 2.6 G/DL (ref 3.5–5.2)
ALP SERPL-CCNC: 160 U/L (ref 55–135)
ALT SERPL W/O P-5'-P-CCNC: 10 U/L (ref 10–44)
ANION GAP SERPL CALC-SCNC: 11 MMOL/L (ref 8–16)
AST SERPL-CCNC: 15 U/L (ref 10–40)
BASOPHILS # BLD AUTO: 0.05 K/UL (ref 0–0.2)
BASOPHILS NFR BLD: 0.4 % (ref 0–1.9)
BILIRUB SERPL-MCNC: 0.3 MG/DL (ref 0.1–1)
BLD GP AB SCN CELLS X3 SERPL QL: NORMAL
BUN SERPL-MCNC: 9 MG/DL (ref 6–20)
CALCIUM SERPL-MCNC: 9.3 MG/DL (ref 8.7–10.5)
CHLORIDE SERPL-SCNC: 109 MMOL/L (ref 95–110)
CO2 SERPL-SCNC: 17 MMOL/L (ref 23–29)
CREAT SERPL-MCNC: 0.6 MG/DL (ref 0.5–1.4)
DIFFERENTIAL METHOD: ABNORMAL
EOSINOPHIL # BLD AUTO: 0.2 K/UL (ref 0–0.5)
EOSINOPHIL NFR BLD: 1.7 % (ref 0–8)
ERYTHROCYTE [DISTWIDTH] IN BLOOD BY AUTOMATED COUNT: 16.9 % (ref 11.5–14.5)
EST. GFR  (NO RACE VARIABLE): >60 ML/MIN/1.73 M^2
GLUCOSE SERPL-MCNC: 93 MG/DL (ref 70–110)
HCT VFR BLD AUTO: 27.7 % (ref 37–48.5)
HGB BLD-MCNC: 8.7 G/DL (ref 12–16)
IMM GRANULOCYTES # BLD AUTO: 0.17 K/UL (ref 0–0.04)
IMM GRANULOCYTES NFR BLD AUTO: 1.5 % (ref 0–0.5)
LYMPHOCYTES # BLD AUTO: 2.7 K/UL (ref 1–4.8)
LYMPHOCYTES NFR BLD: 23.8 % (ref 18–48)
MCH RBC QN AUTO: 23.1 PG (ref 27–31)
MCHC RBC AUTO-ENTMCNC: 31.4 G/DL (ref 32–36)
MCV RBC AUTO: 74 FL (ref 82–98)
MONOCYTES # BLD AUTO: 1 K/UL (ref 0.3–1)
MONOCYTES NFR BLD: 8.4 % (ref 4–15)
NEUTROPHILS # BLD AUTO: 7.3 K/UL (ref 1.8–7.7)
NEUTROPHILS NFR BLD: 64.2 % (ref 38–73)
NRBC BLD-RTO: 1 /100 WBC
PLATELET # BLD AUTO: 329 K/UL (ref 150–450)
PMV BLD AUTO: 11.6 FL (ref 9.2–12.9)
POTASSIUM SERPL-SCNC: 3.8 MMOL/L (ref 3.5–5.1)
PROT SERPL-MCNC: 6.3 G/DL (ref 6–8.4)
RBC # BLD AUTO: 3.77 M/UL (ref 4–5.4)
SODIUM SERPL-SCNC: 137 MMOL/L (ref 136–145)
WBC # BLD AUTO: 11.42 K/UL (ref 3.9–12.7)

## 2023-08-22 PROCEDURE — 62326 NJX INTERLAMINAR LMBR/SAC: CPT | Performed by: NURSE ANESTHETIST, CERTIFIED REGISTERED

## 2023-08-22 PROCEDURE — 71000039 HC RECOVERY, EACH ADD'L HOUR: Performed by: OBSTETRICS & GYNECOLOGY

## 2023-08-22 PROCEDURE — 27800516 HC TRAY, EPIDURAL COMBO: Performed by: STUDENT IN AN ORGANIZED HEALTH CARE EDUCATION/TRAINING PROGRAM

## 2023-08-22 PROCEDURE — 25000003 PHARM REV CODE 250: Performed by: OBSTETRICS & GYNECOLOGY

## 2023-08-22 PROCEDURE — 72100003 HC LABOR CARE, EA. ADDL. 8 HRS

## 2023-08-22 PROCEDURE — 86900 BLOOD TYPING SEROLOGIC ABO: CPT | Performed by: ADVANCED PRACTICE MIDWIFE

## 2023-08-22 PROCEDURE — 85025 COMPLETE CBC W/AUTO DIFF WBC: CPT | Performed by: ADVANCED PRACTICE MIDWIFE

## 2023-08-22 PROCEDURE — 11000001 HC ACUTE MED/SURG PRIVATE ROOM

## 2023-08-22 PROCEDURE — 63600175 PHARM REV CODE 636 W HCPCS: Performed by: OBSTETRICS & GYNECOLOGY

## 2023-08-22 PROCEDURE — 63600175 PHARM REV CODE 636 W HCPCS: Performed by: NURSE ANESTHETIST, CERTIFIED REGISTERED

## 2023-08-22 PROCEDURE — 63600175 PHARM REV CODE 636 W HCPCS: Performed by: MIDWIFE

## 2023-08-22 PROCEDURE — 27200710 HC EPIDURAL INFUSION PUMP SET: Performed by: STUDENT IN AN ORGANIZED HEALTH CARE EDUCATION/TRAINING PROGRAM

## 2023-08-22 PROCEDURE — 72100002 HC LABOR CARE, 1ST 8 HOURS

## 2023-08-22 PROCEDURE — 36004724 HC OB OR TIME LEV III - 1ST 15 MIN: Performed by: OBSTETRICS & GYNECOLOGY

## 2023-08-22 PROCEDURE — 27000181 HC CABLE, IUPC

## 2023-08-22 PROCEDURE — 59514 PR CESAREAN DELIVERY ONLY: ICD-10-PCS | Mod: GB,,, | Performed by: OBSTETRICS & GYNECOLOGY

## 2023-08-22 PROCEDURE — 25000003 PHARM REV CODE 250: Performed by: NURSE ANESTHETIST, CERTIFIED REGISTERED

## 2023-08-22 PROCEDURE — 63600175 PHARM REV CODE 636 W HCPCS: Performed by: ADVANCED PRACTICE MIDWIFE

## 2023-08-22 PROCEDURE — 80053 COMPREHEN METABOLIC PANEL: CPT | Performed by: ADVANCED PRACTICE MIDWIFE

## 2023-08-22 PROCEDURE — 25000003 PHARM REV CODE 250: Performed by: ADVANCED PRACTICE MIDWIFE

## 2023-08-22 PROCEDURE — 71000033 HC RECOVERY, INTIAL HOUR: Performed by: OBSTETRICS & GYNECOLOGY

## 2023-08-22 PROCEDURE — 36004725 HC OB OR TIME LEV III - EA ADD 15 MIN: Performed by: OBSTETRICS & GYNECOLOGY

## 2023-08-22 PROCEDURE — 59514 CESAREAN DELIVERY ONLY: CPT | Mod: GB,,, | Performed by: OBSTETRICS & GYNECOLOGY

## 2023-08-22 PROCEDURE — 37000008 HC ANESTHESIA 1ST 15 MINUTES: Performed by: OBSTETRICS & GYNECOLOGY

## 2023-08-22 PROCEDURE — 37000009 HC ANESTHESIA EA ADD 15 MINS: Performed by: OBSTETRICS & GYNECOLOGY

## 2023-08-22 PROCEDURE — 63600175 PHARM REV CODE 636 W HCPCS: Performed by: STUDENT IN AN ORGANIZED HEALTH CARE EDUCATION/TRAINING PROGRAM

## 2023-08-22 RX ORDER — METHYLERGONOVINE MALEATE 0.2 MG/ML
200 INJECTION INTRAVENOUS
Status: DISCONTINUED | OUTPATIENT
Start: 2023-08-22 | End: 2023-08-23

## 2023-08-22 RX ORDER — CEFAZOLIN SODIUM 2 G/50ML
2 SOLUTION INTRAVENOUS ONCE AS NEEDED
Status: DISCONTINUED | OUTPATIENT
Start: 2023-08-22 | End: 2023-08-23

## 2023-08-22 RX ORDER — MISOPROSTOL 200 UG/1
800 TABLET ORAL ONCE AS NEEDED
Status: DISCONTINUED | OUTPATIENT
Start: 2023-08-22 | End: 2023-08-23

## 2023-08-22 RX ORDER — CARBOPROST TROMETHAMINE 250 UG/ML
250 INJECTION, SOLUTION INTRAMUSCULAR
Status: DISCONTINUED | OUTPATIENT
Start: 2023-08-22 | End: 2023-08-23

## 2023-08-22 RX ORDER — MORPHINE SULFATE 1 MG/ML
INJECTION, SOLUTION EPIDURAL; INTRATHECAL; INTRAVENOUS
Status: DISCONTINUED | OUTPATIENT
Start: 2023-08-22 | End: 2023-08-22

## 2023-08-22 RX ORDER — TRANEXAMIC ACID 10 MG/ML
1000 INJECTION, SOLUTION INTRAVENOUS ONCE AS NEEDED
Status: DISCONTINUED | OUTPATIENT
Start: 2023-08-22 | End: 2023-08-23

## 2023-08-22 RX ORDER — MISOPROSTOL 200 UG/1
800 TABLET ORAL
Status: DISCONTINUED | OUTPATIENT
Start: 2023-08-22 | End: 2023-08-23

## 2023-08-22 RX ORDER — KETOROLAC TROMETHAMINE 30 MG/ML
INJECTION, SOLUTION INTRAMUSCULAR; INTRAVENOUS
Status: DISCONTINUED | OUTPATIENT
Start: 2023-08-22 | End: 2023-08-22

## 2023-08-22 RX ORDER — CEFAZOLIN SODIUM 1 G/3ML
INJECTION, POWDER, FOR SOLUTION INTRAMUSCULAR; INTRAVENOUS
Status: DISCONTINUED | OUTPATIENT
Start: 2023-08-22 | End: 2023-08-22

## 2023-08-22 RX ORDER — SODIUM CHLORIDE 9 MG/ML
INJECTION, SOLUTION INTRAVENOUS
Status: DISCONTINUED | OUTPATIENT
Start: 2023-08-22 | End: 2023-08-23

## 2023-08-22 RX ORDER — OXYTOCIN/RINGER'S LACTATE 30/500 ML
334 PLASTIC BAG, INJECTION (ML) INTRAVENOUS ONCE
Status: DISCONTINUED | OUTPATIENT
Start: 2023-08-22 | End: 2023-08-23

## 2023-08-22 RX ORDER — SODIUM CHLORIDE, SODIUM LACTATE, POTASSIUM CHLORIDE, CALCIUM CHLORIDE 600; 310; 30; 20 MG/100ML; MG/100ML; MG/100ML; MG/100ML
INJECTION, SOLUTION INTRAVENOUS CONTINUOUS
Status: DISCONTINUED | OUTPATIENT
Start: 2023-08-22 | End: 2023-08-23

## 2023-08-22 RX ORDER — ONDANSETRON 2 MG/ML
INJECTION INTRAMUSCULAR; INTRAVENOUS
Status: DISCONTINUED | OUTPATIENT
Start: 2023-08-22 | End: 2023-08-22

## 2023-08-22 RX ORDER — DIPHENOXYLATE HYDROCHLORIDE AND ATROPINE SULFATE 2.5; .025 MG/1; MG/1
1 TABLET ORAL 4 TIMES DAILY PRN
Status: DISCONTINUED | OUTPATIENT
Start: 2023-08-22 | End: 2023-08-23

## 2023-08-22 RX ORDER — OXYTOCIN 10 [USP'U]/ML
10 INJECTION, SOLUTION INTRAMUSCULAR; INTRAVENOUS ONCE AS NEEDED
Status: DISCONTINUED | OUTPATIENT
Start: 2023-08-22 | End: 2023-08-23

## 2023-08-22 RX ORDER — LIDOCAINE HCL/EPINEPHRINE/PF 2%-1:200K
VIAL (ML) INJECTION
Status: DISCONTINUED | OUTPATIENT
Start: 2023-08-22 | End: 2023-08-22

## 2023-08-22 RX ORDER — FENTANYL CITRATE 50 UG/ML
INJECTION, SOLUTION INTRAMUSCULAR; INTRAVENOUS
Status: DISCONTINUED | OUTPATIENT
Start: 2023-08-22 | End: 2023-08-22

## 2023-08-22 RX ORDER — PROCHLORPERAZINE EDISYLATE 5 MG/ML
5 INJECTION INTRAMUSCULAR; INTRAVENOUS EVERY 6 HOURS PRN
Status: DISCONTINUED | OUTPATIENT
Start: 2023-08-22 | End: 2023-08-23

## 2023-08-22 RX ORDER — MIDAZOLAM HYDROCHLORIDE 1 MG/ML
INJECTION, SOLUTION INTRAMUSCULAR; INTRAVENOUS
Status: DISCONTINUED | OUTPATIENT
Start: 2023-08-22 | End: 2023-08-22

## 2023-08-22 RX ORDER — MUPIROCIN 20 MG/G
OINTMENT TOPICAL
Status: DISCONTINUED | OUTPATIENT
Start: 2023-08-22 | End: 2023-08-23

## 2023-08-22 RX ORDER — ROPIVACAINE HYDROCHLORIDE 2 MG/ML
INJECTION, SOLUTION EPIDURAL; INFILTRATION CONTINUOUS PRN
Status: DISCONTINUED | OUTPATIENT
Start: 2023-08-22 | End: 2023-08-22

## 2023-08-22 RX ORDER — SODIUM CITRATE AND CITRIC ACID MONOHYDRATE 334; 500 MG/5ML; MG/5ML
30 SOLUTION ORAL
Status: DISCONTINUED | OUTPATIENT
Start: 2023-08-22 | End: 2023-08-23

## 2023-08-22 RX ORDER — ROPIVACAINE HYDROCHLORIDE 2 MG/ML
INJECTION, SOLUTION EPIDURAL; INFILTRATION
Status: COMPLETED | OUTPATIENT
Start: 2023-08-22 | End: 2023-08-22

## 2023-08-22 RX ORDER — SIMETHICONE 80 MG
1 TABLET,CHEWABLE ORAL 4 TIMES DAILY PRN
Status: DISCONTINUED | OUTPATIENT
Start: 2023-08-22 | End: 2023-08-23

## 2023-08-22 RX ORDER — FAMOTIDINE 10 MG/ML
20 INJECTION INTRAVENOUS
Status: DISCONTINUED | OUTPATIENT
Start: 2023-08-22 | End: 2023-08-23

## 2023-08-22 RX ORDER — TERBUTALINE SULFATE 1 MG/ML
0.25 INJECTION SUBCUTANEOUS
Status: DISCONTINUED | OUTPATIENT
Start: 2023-08-22 | End: 2023-08-23

## 2023-08-22 RX ORDER — ONDANSETRON 8 MG/1
8 TABLET, ORALLY DISINTEGRATING ORAL EVERY 8 HOURS PRN
Status: DISCONTINUED | OUTPATIENT
Start: 2023-08-22 | End: 2023-08-23

## 2023-08-22 RX ORDER — OXYTOCIN/RINGER'S LACTATE 30/500 ML
95 PLASTIC BAG, INJECTION (ML) INTRAVENOUS ONCE
Status: DISCONTINUED | OUTPATIENT
Start: 2023-08-22 | End: 2023-08-23

## 2023-08-22 RX ORDER — CALCIUM CARBONATE 200(500)MG
500 TABLET,CHEWABLE ORAL 3 TIMES DAILY PRN
Status: DISCONTINUED | OUTPATIENT
Start: 2023-08-22 | End: 2023-08-23

## 2023-08-22 RX ORDER — DIPHENHYDRAMINE HYDROCHLORIDE 50 MG/ML
50 INJECTION INTRAMUSCULAR; INTRAVENOUS EVERY 6 HOURS PRN
Status: DISCONTINUED | OUTPATIENT
Start: 2023-08-22 | End: 2023-08-23

## 2023-08-22 RX ORDER — OXYTOCIN/RINGER'S LACTATE 30/500 ML
95 PLASTIC BAG, INJECTION (ML) INTRAVENOUS ONCE AS NEEDED
Status: DISCONTINUED | OUTPATIENT
Start: 2023-08-22 | End: 2023-08-23

## 2023-08-22 RX ORDER — DIPHENHYDRAMINE HYDROCHLORIDE 50 MG/ML
25 INJECTION INTRAMUSCULAR; INTRAVENOUS ONCE
Status: DISCONTINUED | OUTPATIENT
Start: 2023-08-22 | End: 2023-08-23

## 2023-08-22 RX ORDER — LIDOCAINE HYDROCHLORIDE 10 MG/ML
10 INJECTION, SOLUTION EPIDURAL; INFILTRATION; INTRACAUDAL; PERINEURAL ONCE AS NEEDED
Status: DISCONTINUED | OUTPATIENT
Start: 2023-08-22 | End: 2023-08-23

## 2023-08-22 RX ORDER — DIPHENOXYLATE HYDROCHLORIDE AND ATROPINE SULFATE 2.5; .025 MG/1; MG/1
2 TABLET ORAL EVERY 6 HOURS PRN
Status: DISCONTINUED | OUTPATIENT
Start: 2023-08-22 | End: 2023-08-23

## 2023-08-22 RX ORDER — DIPHENHYDRAMINE HYDROCHLORIDE 50 MG/ML
25 INJECTION INTRAMUSCULAR; INTRAVENOUS ONCE AS NEEDED
Status: DISCONTINUED | OUTPATIENT
Start: 2023-08-22 | End: 2023-08-22

## 2023-08-22 RX ORDER — OXYTOCIN/RINGER'S LACTATE 30/500 ML
0-30 PLASTIC BAG, INJECTION (ML) INTRAVENOUS CONTINUOUS
Status: DISCONTINUED | OUTPATIENT
Start: 2023-08-22 | End: 2023-08-23

## 2023-08-22 RX ORDER — OXYTOCIN/RINGER'S LACTATE 30/500 ML
334 PLASTIC BAG, INJECTION (ML) INTRAVENOUS ONCE AS NEEDED
Status: DISCONTINUED | OUTPATIENT
Start: 2023-08-22 | End: 2023-08-23

## 2023-08-22 RX ORDER — OXYTOCIN/RINGER'S LACTATE 30/500 ML
PLASTIC BAG, INJECTION (ML) INTRAVENOUS CONTINUOUS PRN
Status: DISCONTINUED | OUTPATIENT
Start: 2023-08-22 | End: 2023-08-22

## 2023-08-22 RX ADMIN — LIDOCAINE HYDROCHLORIDE AND EPINEPHRINE 5 ML: 20; 5 INJECTION, SOLUTION EPIDURAL; INFILTRATION; INTRACAUDAL; PERINEURAL at 09:08

## 2023-08-22 RX ADMIN — MORPHINE SULFATE 8 MG: 1 INJECTION, SOLUTION EPIDURAL; INTRATHECAL; INTRAVENOUS at 10:08

## 2023-08-22 RX ADMIN — Medication 2 MILLI-UNITS/MIN: at 09:08

## 2023-08-22 RX ADMIN — MORPHINE SULFATE 2 MG: 1 INJECTION, SOLUTION EPIDURAL; INTRATHECAL; INTRAVENOUS at 10:08

## 2023-08-22 RX ADMIN — Medication 334 ML/HR: at 10:08

## 2023-08-22 RX ADMIN — SODIUM CHLORIDE, SODIUM LACTATE, POTASSIUM CHLORIDE, AND CALCIUM CHLORIDE: .6; .31; .03; .02 INJECTION, SOLUTION INTRAVENOUS at 09:08

## 2023-08-22 RX ADMIN — AZITHROMYCIN MONOHYDRATE 500 MG: 500 INJECTION, POWDER, LYOPHILIZED, FOR SOLUTION INTRAVENOUS at 09:08

## 2023-08-22 RX ADMIN — ONDANSETRON 4 MG: 2 INJECTION INTRAMUSCULAR; INTRAVENOUS at 09:08

## 2023-08-22 RX ADMIN — FENTANYL CITRATE 100 MCG: 0.05 INJECTION, SOLUTION INTRAMUSCULAR; INTRAVENOUS at 09:08

## 2023-08-22 RX ADMIN — SODIUM CHLORIDE: 9 INJECTION, SOLUTION INTRAVENOUS at 09:08

## 2023-08-22 RX ADMIN — KETOROLAC TROMETHAMINE 30 MG: 30 INJECTION, SOLUTION INTRAMUSCULAR; INTRAVENOUS at 10:08

## 2023-08-22 RX ADMIN — MIDAZOLAM 2 MG: 1 INJECTION INTRAMUSCULAR; INTRAVENOUS at 10:08

## 2023-08-22 RX ADMIN — VANCOMYCIN HYDROCHLORIDE 2000 MG: 10 INJECTION, POWDER, LYOPHILIZED, FOR SOLUTION INTRAVENOUS at 08:08

## 2023-08-22 RX ADMIN — ROPIVACAINE HYDROCHLORIDE 12 ML/HR: 2 INJECTION, SOLUTION EPIDURAL; INFILTRATION at 08:08

## 2023-08-22 RX ADMIN — CEFAZOLIN 2 G: 330 INJECTION, POWDER, FOR SOLUTION INTRAMUSCULAR; INTRAVENOUS at 09:08

## 2023-08-22 RX ADMIN — SODIUM CHLORIDE, POTASSIUM CHLORIDE, SODIUM LACTATE AND CALCIUM CHLORIDE: 600; 310; 30; 20 INJECTION, SOLUTION INTRAVENOUS at 08:08

## 2023-08-22 RX ADMIN — SODIUM CHLORIDE, POTASSIUM CHLORIDE, SODIUM LACTATE AND CALCIUM CHLORIDE 1000 ML: 600; 310; 30; 20 INJECTION, SOLUTION INTRAVENOUS at 08:08

## 2023-08-22 RX ADMIN — ROPIVACAINE HYDROCHLORIDE 8 ML: 2 INJECTION, SOLUTION EPIDURAL; INFILTRATION at 08:08

## 2023-08-22 NOTE — ANESTHESIA PREPROCEDURE EVALUATION
08/22/2023  Carolyn Mcfarlane is a 38 y.o., female.      Pre-op Assessment    I have reviewed the Patient Summary Reports.     I have reviewed the Nursing Notes.    I have reviewed the Medications.     Review of Systems  Anesthesia Hx:  No problems with previous Anesthesia  History of prior surgery of interest to airway management or planning: Previous anesthesia: Epidural Denies Family Hx of Anesthesia complications.   Denies Personal Hx of Anesthesia complications.   Social:  Non-Smoker, No Alcohol Use    Hematology/Oncology:  Hematology Normal   Oncology Normal     EENT/Dental:EENT/Dental Normal   Cardiovascular:  Cardiovascular Normal Exercise tolerance: good  Denies Pacemaker.  Denies Hypertension.  NYHA Classification I ECG has been reviewed.    Pulmonary:   Asthma    Renal/:  Renal/ Normal     Hepatic/GI:  Hepatic/GI Normal    Musculoskeletal:  Musculoskeletal Normal    Neurological:  Neurology Normal    Endocrine:  Endocrine Normal    Psych:   Psychiatric History depression          Physical Exam  General: Well nourished, Cooperative, Alert and Oriented    Airway:  Mallampati: II   Mouth Opening: Normal  TM Distance: Normal  Tongue: Normal  Neck ROM: Normal ROM    Dental:  Intact        Anesthesia Plan  Type of Anesthesia, risks & benefits discussed:    Anesthesia Type: Epidural  Intra-op Monitoring Plan: Standard ASA Monitors  Post Op Pain Control Plan: epidural analgesia  Informed Consent: Informed consent signed with the Patient and all parties understand the risks and agree with anesthesia plan.  All questions answered.   ASA Score: 2  Day of Surgery Review of History & Physical: I have interviewed and examined the patient. I have reviewed the patient's H&P dated:     Ready For Surgery From Anesthesia Perspective.     .

## 2023-08-22 NOTE — ANESTHESIA PROCEDURE NOTES
Epidural    Patient location during procedure: OB   Reason for block: primary anesthetic   Reason for block: labor analgesia requested by patient and obstetrician  Diagnosis: Labor   Start time: 8/22/2023 8:25 AM  Timeout: 8/22/2023 8:25 AM  End time: 8/22/2023 8:46 AM    Staffing  Performing Provider: Elly Diggs CRNA  Authorizing Provider: Sami Guajardo MD    Staffing  Performed by: Elly Diggs CRNA  Authorized by: Sami Guajardo MD        Preanesthetic Checklist  Completed: patient identified, IV checked, site marked, risks and benefits discussed, surgical consent, monitors and equipment checked, pre-op evaluation, timeout performed, anesthesia consent given, hand hygiene performed and patient being monitored  Preparation  Patient position: sitting  Prep: ChloraPrep  Patient monitoring: ECG, Pulse Ox, Blood Pressure and continuous capnometry  Reason for block: primary anesthetic   Epidural  Skin Anesthetic: lidocaine 1%  Skin Wheal: 3 mL  Administration type: continuous  Approach: midline  Interspace: L4-5    Injection technique: NAVI air  Needle and Epidural Catheter  Needle type: Tuohy   Needle gauge: 18  Needle length: 3.5 inches  Catheter type: end hole  Catheter size: 20 G  Catheter at skin depth: 12 cm  Insertion Attempts: 1  Test dose: 3 mL of lidocaine 1.5% with Epi 1-to-200,000  Additional Documentation: incremental injection, negative aspiration for heme and CSF, no paresthesia on injection, no signs/symptoms of IV or SA injection, no significant complaints from patient and no significant pain on injection  Needle localization: anatomical landmarks  Assessment  Upper dermatomal levels - Left: T10  Right: T10   Dermatomal levels determined by alcohol wipe  Ease of block: easy  Patient's tolerance of the procedure: comfortable throughout block No inadvertent dural puncture with Tuohy.  Dural puncture not performed with spinal needle    Medications:    Medications: ropivacaine  (NAROPIN) solution 0.2% - Epidural   8 mL - 8/22/2023 8:45:00 AM

## 2023-08-22 NOTE — SUBJECTIVE & OBJECTIVE
Interval History:  Carolyn is a 38 y.o.  at 39w1d. She is doing well. Agrees to Pitocin augmentation.     Objective:     Vital Signs (Most Recent):  Temp: 97.8 °F (36.6 °C) (23 0545)  Pulse: 92 (23 0630)  Resp: 18 (23 0545)  BP: 119/74 (23 0630)  SpO2: 100 % (23) Vital Signs (24h Range):  Temp:  [97.8 °F (36.6 °C)] 97.8 °F (36.6 °C)  Pulse:  [85-96] 92  Resp:  [18] 18  SpO2:  [99 %-100 %] 100 %  BP: (111-119)/(74-76) 119/74     Weight: 78.1 kg (172 lb 2.9 oz)  Body mass index is 33.63 kg/m².    FHT: Cat 1 (reassuring)  TOCO:  rare    Cervical Exam:  Dilation:  -/-3 per RN exam     Significant Labs:  Lab Results   Component Value Date    GROUPTRH O POS 2023    HEPBSAG Non-reactive 2023    STREPBCULT (A) 2023     STREPTOCOCCUS AGALACTIAE (GROUP B)  In case of Penicillin allergy, call lab for further testing.  Beta-hemolytic streptococci are routinely susceptible to   penicillins,cephalosporins and carbapenems.         I have personallly reviewed all pertinent lab results from the last 24 hours.    Physical Exam:   Constitutional: She is oriented to person, place, and time. She appears well-developed and well-nourished.    HENT:   Head: Normocephalic and atraumatic.    Eyes: Conjunctivae and EOM are normal.     Cardiovascular:  Normal rate.             Pulmonary/Chest: Effort normal. No respiratory distress.        Abdominal: Soft. She exhibits no distension. There is no abdominal tenderness.     Genitourinary:    Uterus normal.   There is vaginal discharge (grossly ruptured clear amniotic fluid) in the vagina.           Musculoskeletal: Normal range of motion and moves all extremeties.       Neurological: She is alert and oriented to person, place, and time.    Skin: Skin is warm and dry.    Psychiatric: She has a normal mood and affect. Her behavior is normal. Judgment and thought content normal.       Review of Systems

## 2023-08-22 NOTE — HOSPITAL COURSE
Admit for labor augmentation  Vancomycin for GBS  2023--maximum dilation of 7 cm;pitocin having to be stopped multiple times due to fetal heart tones; when pitocin off, fht very reassuring but contractions space out to 5-7 cm; pt requesting and ready to proceed with primary c/seciton  Patient underwent primary  without complication.  Transferred to mother baby unit for routine post partum care. Patient progressed well postoperatively without complication.

## 2023-08-22 NOTE — PROGRESS NOTES
[facility] - [facility]  Obstetrics  Labor Progress Note    Patient Name: Carolyn Mcfarlane  MRN: [id number]  Admission Date: 2023  Hospital Length of Stay: 0 days  Attending Physician: Caterina Cabral MD  Primary Care Provider: Florina, Primary Doctor    Subjective:     Principal Problem:Amniotic fluid leaking    Hospital Course:  Admit for labor augmentation  Vancomycin for GBS      Interval History:  Carolyn is a 38 y.o.  at 39w1d. She is doing well. Agrees to Pitocin augmentation.     Objective:     Vital Signs (Most Recent):  Temp: 97.8 °F (36.6 °C) (23 0545)  Pulse: 92 (23)  Resp: 18 (23)  BP: 119/74 (23)  SpO2: 100 % (23) Vital Signs (24h Range):  Temp:  [97.8 °F (36.6 °C)] 97.8 °F (36.6 °C)  Pulse:  [85-96] 92  Resp:  [18] 18  SpO2:  [99 %-100 %] 100 %  BP: (111-119)/(74-76) 119/74     Weight: 78.1 kg (172 lb 2.9 oz)  Body mass index is 33.63 kg/m².    FHT: Cat 1 (reassuring)  TOCO:  rare    Cervical Exam:  Dilation:  1-2/70/-3 per RN exam     Significant Labs:  Lab Results   Component Value Date    GROUPTRH O POS 2023    HEPBSAG Non-reactive 2023    STREPBCULT (A) 2023     STREPTOCOCCUS AGALACTIAE (GROUP B)  In case of Penicillin allergy, call lab for further testing.  Beta-hemolytic streptococci are routinely susceptible to   penicillins,cephalosporins and carbapenems.         I have personallly reviewed all pertinent lab results from the last 24 hours.    Physical Exam:   Constitutional: She is oriented to person, place, and time. She appears well-developed and well-nourished.    HENT:   Head: Normocephalic and atraumatic.    Eyes: Conjunctivae and EOM are normal.     Cardiovascular:  Normal rate.             Pulmonary/Chest: Effort normal. No respiratory distress.        Abdominal: Soft. She exhibits no distension. There is no abdominal tenderness.     Genitourinary:    Uterus normal.   There is vaginal discharge (grossly ruptured clear  amniotic fluid) in the vagina.           Musculoskeletal: Normal range of motion and moves all extremeties.       Neurological: She is alert and oriented to person, place, and time.    Skin: Skin is warm and dry.    Psychiatric: She has a normal mood and affect. Her behavior is normal. Judgment and thought content normal.       Review of Systems    Assessment/Plan:     38 y.o. female  at 39w1d for:    * Amniotic fluid leaking  Admit for labor augmentation  Pitocin  Epidural per pt request  Continue to monitor maternal/fetal status closely  Anticipate progress and vaginal birth    GBS +  Vancomycin in labor    Anemia of mother in pregnancy, antepartum, third trimester  Continue iron PP    Polyhydramnios in third trimester  Leaking large amount of clear fluid      Depression, unspecified  Will monitor        Stephanie Batista CNM  Obstetrics  [facility]

## 2023-08-22 NOTE — PROGRESS NOTES
LABOR NOTE    S:  Pt resting comfortably with epidural, no complaints.  Family at bedside and supportive.     O: /74   Pulse 92   Temp 97.8 °F (36.6 °C) (Oral)   Resp 18   Ht 5' (1.524 m)   Wt 78.1 kg (172 lb 2.9 oz)   SpO2 100%   Breastfeeding No   BMI 33.63 kg/m²     GENERAL: Calm and appropriate affect  NEURO: Alert, oriented, normal speech  ABDOMEN: Nontender, Fundus palpates soft between UC's.  FHT: Cat 1, reassuring.   CTX: q 2-3 minutes  SVE: 3-/-2      ASSESSMENT:   38 y.o.  IUP at 39w1d, FHT reassuring/ Cat 1    Patient Active Problem List   Diagnosis    Asthma affecting pregnancy in second trimester    Anxiety    AMA (advanced maternal age) multigravida 35+, third trimester    Depression, unspecified    Marginal insertion of umbilical cord affecting management of mother in second trimester    Polyhydramnios in third trimester    Anemia of mother in pregnancy, antepartum, third trimester    GBS +    Amniotic fluid leaking         PLAN:    Pitocin Augmentation per protocol - currently infusing at 4mu  Continue to monitor maternal/fetal status closely  Anticipate progress and vaginal birth

## 2023-08-22 NOTE — ASSESSMENT & PLAN NOTE
Admit for labor augmentation  Pitocin  Epidural per pt request  Continue to monitor maternal/fetal status closely  Anticipate progress and vaginal birth

## 2023-08-22 NOTE — H&P
[facility] - Labor & Delivery  Obstetrics  History & Physical    Patient Name: Carolyn Mcfarlane  MRN: [id number]  Admission Date: 2023  Primary Care Provider: Florina, Primary Doctor    Subjective:     Principal Problem:Amniotic fluid leaking    History of Present Illness:  Presents with gross ROM      Obstetric HPI:  Patient reports some contractions, active fetal movement, No vaginal bleeding , No loss of fluid     This pregnancy has been complicated by AMA, polyhydramnios, + GBS, PCN allergy, marginal cord insertion    OB History    Para Term  AB Living   2 1 1 0 0 1   SAB IAB Ectopic Multiple Live Births   0 0 0 0 1      # Outcome Date GA Lbr Josue/2nd Weight Sex Delivery Anes PTL Lv   2 Current            1 Term 11 38w0d  2.863 kg (6 lb 5 oz) M Vag-Spont EPI  DARRELL      Name: Amandeep     Past Medical History:   Diagnosis Date    Mild intermittent asthma, uncomplicated      No past surgical history on file.    PTA Medications   Medication Sig    albuterol (PROVENTIL) 2.5 mg /3 mL (0.083 %) nebulizer solution Inhale 2.5 mg into the lungs every 6 (six) hours as needed.    albuterol (PROVENTIL/VENTOLIN HFA) 90 mcg/actuation inhaler 2 puffs every 4 (four) hours as needed.    aspirin (ECOTRIN) 81 MG EC tablet Take 1 tablet (81 mg total) by mouth once daily. (Patient not taking: Reported on 2023)    cetirizine (ZYRTEC) 10 MG tablet Take 10 mg by mouth once daily. As needed    ondansetron (ZOFRAN-ODT) 4 MG TbDL Take 1 tablet (4 mg total) by mouth 2 (two) times daily. (Patient not taking: Reported on 2023)    promethazine (PHENERGAN) 12.5 MG Tab Take 1 tablet (12.5 mg total) by mouth every 6 (six) hours as needed (Nausea).       Review of patient's allergies indicates:   Allergen Reactions    Clindamycin Anaphylaxis    Shellfish containing products Shortness Of Breath    Cherry flavor     Penicillins         Family History       Problem Relation (Age of Onset)    Diabetes Mother    Hypertension  Mother          Tobacco Use    Smoking status: Former     Current packs/day: 0.00     Types: Cigarettes    Smokeless tobacco: Never   Substance and Sexual Activity    Alcohol use: Not Currently    Drug use: Not Currently    Sexual activity: Yes     Partners: Male     Review of Systems   Genitourinary:  Positive for vaginal discharge.        Leaking clear fluid   Musculoskeletal:  Positive for back pain.   All other systems reviewed and are negative.     Objective:     Vital Signs (Most Recent):    Vital Signs (24h Range):           There is no height or weight on file to calculate BMI.    FHT: applied  TOCO:  applied     Physical Exam:   Constitutional: She is oriented to person, place, and time. She appears well-developed and well-nourished.        Pulmonary/Chest: Effort normal.        Abdominal: Soft.     Genitourinary:    Uterus normal.             Musculoskeletal: Normal range of motion and moves all extremeties.       Neurological: She is alert and oriented to person, place, and time.    Skin: Skin is warm and dry.    Psychiatric: She has a normal mood and affect. Her behavior is normal. Judgment and thought content normal.        Cervix:  To be determined, was 50 @ last OV     Significant Labs:  Lab Results   Component Value Date    GROUPTRH O POS 2023    HEPBSAG Non-reactive 2023    STREPBCULT (A) 2023     STREPTOCOCCUS AGALACTIAE (GROUP B)  In case of Penicillin allergy, call lab for further testing.  Beta-hemolytic streptococci are routinely susceptible to   penicillins,cephalosporins and carbapenems.         I have personallly reviewed all pertinent lab results from the last 24 hours.    Assessment/Plan:     38 y.o. female  at 39w1d for:    * Amniotic fluid leaking  Admit for labor augmentation    GBS +  Vancomycin in labor    Polyhydramnios in third trimester  Leaking large amount of clear fluid      Depression, unspecified  Will monitor          Ute Cárdenas,  CNM  Obstetrics  [facility] - [facility]

## 2023-08-22 NOTE — SUBJECTIVE & OBJECTIVE
Obstetric HPI:  Patient reports some contractions, active fetal movement, No vaginal bleeding , No loss of fluid     This pregnancy has been complicated by AMA, polyhydramnios, + GBS, PCN allergy, marginal cord insertion    OB History    Para Term  AB Living   2 1 1 0 0 1   SAB IAB Ectopic Multiple Live Births   0 0 0 0 1      # Outcome Date GA Lbr Josue/2nd Weight Sex Delivery Anes PTL Lv   2 Current            1 Term 11 38w0d  2.863 kg (6 lb 5 oz) M Vag-Spont EPI  DARRELL      Name: Amandeep     Past Medical History:   Diagnosis Date    Mild intermittent asthma, uncomplicated      No past surgical history on file.    PTA Medications   Medication Sig    albuterol (PROVENTIL) 2.5 mg /3 mL (0.083 %) nebulizer solution Inhale 2.5 mg into the lungs every 6 (six) hours as needed.    albuterol (PROVENTIL/VENTOLIN HFA) 90 mcg/actuation inhaler 2 puffs every 4 (four) hours as needed.    aspirin (ECOTRIN) 81 MG EC tablet Take 1 tablet (81 mg total) by mouth once daily. (Patient not taking: Reported on 2023)    cetirizine (ZYRTEC) 10 MG tablet Take 10 mg by mouth once daily. As needed    ondansetron (ZOFRAN-ODT) 4 MG TbDL Take 1 tablet (4 mg total) by mouth 2 (two) times daily. (Patient not taking: Reported on 2023)    promethazine (PHENERGAN) 12.5 MG Tab Take 1 tablet (12.5 mg total) by mouth every 6 (six) hours as needed (Nausea).       Review of patient's allergies indicates:   Allergen Reactions    Clindamycin Anaphylaxis    Shellfish containing products Shortness Of Breath    Cherry flavor     Penicillins         Family History       Problem Relation (Age of Onset)    Diabetes Mother    Hypertension Mother          Tobacco Use    Smoking status: Former     Current packs/day: 0.00     Types: Cigarettes    Smokeless tobacco: Never   Substance and Sexual Activity    Alcohol use: Not Currently    Drug use: Not Currently    Sexual activity: Yes     Partners: Male     Review of Systems   Genitourinary:   Positive for vaginal discharge.        Leaking clear fluid   Musculoskeletal:  Positive for back pain.   All other systems reviewed and are negative.     Objective:     Vital Signs (Most Recent):    Vital Signs (24h Range):           There is no height or weight on file to calculate BMI.    FHT: applied  TOCO:  applied     Physical Exam:   Constitutional: She is oriented to person, place, and time. She appears well-developed and well-nourished.        Pulmonary/Chest: Effort normal.        Abdominal: Soft.     Genitourinary:    Uterus normal.             Musculoskeletal: Normal range of motion and moves all extremeties.       Neurological: She is alert and oriented to person, place, and time.    Skin: Skin is warm and dry.    Psychiatric: She has a normal mood and affect. Her behavior is normal. Judgment and thought content normal.        Cervix:  To be determined, was 1/50 @ last OV     Significant Labs:  Lab Results   Component Value Date    GROUPTRH O POS 06/08/2023    HEPBSAG Non-reactive 06/08/2023    STREPBCULT (A) 07/26/2023     STREPTOCOCCUS AGALACTIAE (GROUP B)  In case of Penicillin allergy, call lab for further testing.  Beta-hemolytic streptococci are routinely susceptible to   penicillins,cephalosporins and carbapenems.         I have personallly reviewed all pertinent lab results from the last 24 hours.

## 2023-08-23 PROCEDURE — 11000001 HC ACUTE MED/SURG PRIVATE ROOM

## 2023-08-23 PROCEDURE — 51798 US URINE CAPACITY MEASURE: CPT

## 2023-08-23 PROCEDURE — 25000003 PHARM REV CODE 250: Performed by: OBSTETRICS & GYNECOLOGY

## 2023-08-23 PROCEDURE — A4216 STERILE WATER/SALINE, 10 ML: HCPCS | Performed by: OBSTETRICS & GYNECOLOGY

## 2023-08-23 PROCEDURE — 63600175 PHARM REV CODE 636 W HCPCS: Performed by: OBSTETRICS & GYNECOLOGY

## 2023-08-23 PROCEDURE — 51701 INSERT BLADDER CATHETER: CPT

## 2023-08-23 RX ORDER — DIPHENHYDRAMINE HCL 25 MG
25 CAPSULE ORAL EVERY 4 HOURS PRN
Status: DISCONTINUED | OUTPATIENT
Start: 2023-08-23 | End: 2023-08-25 | Stop reason: HOSPADM

## 2023-08-23 RX ORDER — SIMETHICONE 80 MG
1 TABLET,CHEWABLE ORAL EVERY 6 HOURS PRN
Status: DISCONTINUED | OUTPATIENT
Start: 2023-08-23 | End: 2023-08-25 | Stop reason: HOSPADM

## 2023-08-23 RX ORDER — SODIUM CHLORIDE 0.9 % (FLUSH) 0.9 %
2 SYRINGE (ML) INJECTION EVERY 6 HOURS
Status: DISCONTINUED | OUTPATIENT
Start: 2023-08-23 | End: 2023-08-24

## 2023-08-23 RX ORDER — IBUPROFEN 800 MG/1
800 TABLET ORAL EVERY 8 HOURS
Status: DISCONTINUED | OUTPATIENT
Start: 2023-08-24 | End: 2023-08-25 | Stop reason: HOSPADM

## 2023-08-23 RX ORDER — OXYTOCIN/RINGER'S LACTATE 30/500 ML
95 PLASTIC BAG, INJECTION (ML) INTRAVENOUS ONCE
Status: DISCONTINUED | OUTPATIENT
Start: 2023-08-23 | End: 2023-08-25 | Stop reason: HOSPADM

## 2023-08-23 RX ORDER — HYDROCORTISONE 25 MG/G
CREAM TOPICAL 3 TIMES DAILY PRN
Status: DISCONTINUED | OUTPATIENT
Start: 2023-08-23 | End: 2023-08-25 | Stop reason: HOSPADM

## 2023-08-23 RX ORDER — KETOROLAC TROMETHAMINE 30 MG/ML
30 INJECTION, SOLUTION INTRAMUSCULAR; INTRAVENOUS EVERY 6 HOURS
Status: COMPLETED | OUTPATIENT
Start: 2023-08-23 | End: 2023-08-24

## 2023-08-23 RX ORDER — MISOPROSTOL 200 UG/1
800 TABLET ORAL ONCE AS NEEDED
Status: DISCONTINUED | OUTPATIENT
Start: 2023-08-23 | End: 2023-08-25 | Stop reason: HOSPADM

## 2023-08-23 RX ORDER — HYDROCODONE BITARTRATE AND ACETAMINOPHEN 7.5; 325 MG/1; MG/1
1 TABLET ORAL EVERY 4 HOURS PRN
Status: DISCONTINUED | OUTPATIENT
Start: 2023-08-23 | End: 2023-08-25 | Stop reason: HOSPADM

## 2023-08-23 RX ORDER — PROCHLORPERAZINE EDISYLATE 5 MG/ML
5 INJECTION INTRAMUSCULAR; INTRAVENOUS EVERY 6 HOURS PRN
Status: DISCONTINUED | OUTPATIENT
Start: 2023-08-23 | End: 2023-08-25 | Stop reason: HOSPADM

## 2023-08-23 RX ORDER — HYDROCODONE BITARTRATE AND ACETAMINOPHEN 5; 325 MG/1; MG/1
1 TABLET ORAL EVERY 4 HOURS PRN
Status: DISCONTINUED | OUTPATIENT
Start: 2023-08-23 | End: 2023-08-25 | Stop reason: HOSPADM

## 2023-08-23 RX ORDER — DOCUSATE SODIUM 100 MG/1
100 CAPSULE, LIQUID FILLED ORAL 2 TIMES DAILY
Status: DISCONTINUED | OUTPATIENT
Start: 2023-08-23 | End: 2023-08-25 | Stop reason: HOSPADM

## 2023-08-23 RX ORDER — AMOXICILLIN 250 MG
1 CAPSULE ORAL NIGHTLY
Status: DISCONTINUED | OUTPATIENT
Start: 2023-08-23 | End: 2023-08-25 | Stop reason: HOSPADM

## 2023-08-23 RX ORDER — PRENATAL WITH FERROUS FUM AND FOLIC ACID 3080; 920; 120; 400; 22; 1.84; 3; 20; 10; 1; 12; 200; 27; 25; 2 [IU]/1; [IU]/1; MG/1; [IU]/1; MG/1; MG/1; MG/1; MG/1; MG/1; MG/1; UG/1; MG/1; MG/1; MG/1; MG/1
1 TABLET ORAL DAILY
Status: DISCONTINUED | OUTPATIENT
Start: 2023-08-23 | End: 2023-08-25 | Stop reason: HOSPADM

## 2023-08-23 RX ORDER — OXYTOCIN/RINGER'S LACTATE 30/500 ML
95 PLASTIC BAG, INJECTION (ML) INTRAVENOUS ONCE AS NEEDED
Status: DISCONTINUED | OUTPATIENT
Start: 2023-08-23 | End: 2023-08-25 | Stop reason: HOSPADM

## 2023-08-23 RX ORDER — HYDROMORPHONE HYDROCHLORIDE 2 MG/ML
1 INJECTION, SOLUTION INTRAMUSCULAR; INTRAVENOUS; SUBCUTANEOUS EVERY 4 HOURS PRN
Status: DISCONTINUED | OUTPATIENT
Start: 2023-08-23 | End: 2023-08-25 | Stop reason: HOSPADM

## 2023-08-23 RX ORDER — CETIRIZINE HYDROCHLORIDE 10 MG/1
10 TABLET ORAL DAILY
Status: DISCONTINUED | OUTPATIENT
Start: 2023-08-23 | End: 2023-08-25 | Stop reason: HOSPADM

## 2023-08-23 RX ORDER — LANOLIN ALCOHOL/MO/W.PET/CERES
1 CREAM (GRAM) TOPICAL DAILY
Status: DISCONTINUED | OUTPATIENT
Start: 2023-08-23 | End: 2023-08-25 | Stop reason: HOSPADM

## 2023-08-23 RX ORDER — OXYTOCIN 10 [USP'U]/ML
10 INJECTION, SOLUTION INTRAMUSCULAR; INTRAVENOUS ONCE AS NEEDED
Status: DISCONTINUED | OUTPATIENT
Start: 2023-08-23 | End: 2023-08-25 | Stop reason: HOSPADM

## 2023-08-23 RX ORDER — OXYTOCIN/RINGER'S LACTATE 30/500 ML
334 PLASTIC BAG, INJECTION (ML) INTRAVENOUS ONCE AS NEEDED
Status: DISCONTINUED | OUTPATIENT
Start: 2023-08-23 | End: 2023-08-25 | Stop reason: HOSPADM

## 2023-08-23 RX ORDER — ALBUTEROL SULFATE 90 UG/1
2 AEROSOL, METERED RESPIRATORY (INHALATION) EVERY 4 HOURS PRN
Status: DISCONTINUED | OUTPATIENT
Start: 2023-08-23 | End: 2023-08-25 | Stop reason: HOSPADM

## 2023-08-23 RX ORDER — ONDANSETRON 8 MG/1
8 TABLET, ORALLY DISINTEGRATING ORAL EVERY 8 HOURS PRN
Status: DISCONTINUED | OUTPATIENT
Start: 2023-08-23 | End: 2023-08-25 | Stop reason: HOSPADM

## 2023-08-23 RX ADMIN — SODIUM CHLORIDE 2 ML: 9 INJECTION, SOLUTION INTRAMUSCULAR; INTRAVENOUS; SUBCUTANEOUS at 06:08

## 2023-08-23 RX ADMIN — PRENATAL VITAMINS-IRON FUMARATE 27 MG IRON-FOLIC ACID 0.8 MG TABLET 1 TABLET: at 09:08

## 2023-08-23 RX ADMIN — DOCUSATE SODIUM 100 MG: 100 CAPSULE, LIQUID FILLED ORAL at 09:08

## 2023-08-23 RX ADMIN — KETOROLAC TROMETHAMINE 30 MG: 30 INJECTION, SOLUTION INTRAMUSCULAR; INTRAVENOUS at 12:08

## 2023-08-23 RX ADMIN — SENNOSIDES AND DOCUSATE SODIUM 1 TABLET: 50; 8.6 TABLET ORAL at 09:08

## 2023-08-23 RX ADMIN — KETOROLAC TROMETHAMINE 30 MG: 30 INJECTION, SOLUTION INTRAMUSCULAR; INTRAVENOUS at 05:08

## 2023-08-23 RX ADMIN — HYDROCODONE BITARTRATE AND ACETAMINOPHEN 1 TABLET: 7.5; 325 TABLET ORAL at 03:08

## 2023-08-23 RX ADMIN — ONDANSETRON 8 MG: 8 TABLET, ORALLY DISINTEGRATING ORAL at 12:08

## 2023-08-23 NOTE — PLAN OF CARE
[facility] (Hospital)  Discharge Assessment    Primary Care Provider: No, Primary Doctor     OB Screen (most recent)       OB Screen - 23 1421          OB SCREEN    Assessment Type Discharge Planning Assessment     Source of Information patient;health record     Received Prenatal Care Yes     Any indications/suspicions for None     Is this a teen pregnancy No     Is the baby in NICU No     Indication for adoption/[facility] No     Indication for DME/post-acute needs No     HIV (+) No     Any congenital  disorders No     Fetal demise/ death No

## 2023-08-23 NOTE — PROGRESS NOTES
LABOR NOTE    S:  Pt resting comfortably with epidural, no complaints.      O: /70   Pulse 90   Temp 97.8 °F (36.6 °C) (Oral)   Resp 18   Ht 5' (1.524 m)   Wt 78.1 kg (172 lb 2.9 oz)   SpO2 100%   Breastfeeding No   BMI 33.63 kg/m²     GENERAL: Calm and appropriate affect  NEURO: Alert, oriented, normal speech  ABDOMEN: Nontender, Fundus palpates soft between UC's.  FHT: Cat 2. Intermittent late and variable decels that improve with position changes.   CTX: q 1.5-6 minutes  SVE: 780/-2, IUPC      ASSESSMENT:   38 y.o.  IUP at 39w1d, FHT reassuring/ Cat 1    Patient Active Problem List   Diagnosis    Asthma affecting pregnancy in second trimester    Anxiety    AMA (advanced maternal age) multigravida 35+, third trimester    Depression, unspecified    Marginal insertion of umbilical cord affecting management of mother in second trimester    Polyhydramnios in third trimester    Anemia of mother in pregnancy, antepartum, third trimester    GBS +    Amniotic fluid leaking         PLAN:    Reviewed strip with Dr. Noel. Will turn Pitocin off for now and start amnioinfusion.   Allergic reaction to Vancomycin that improved after dose of IV Benadryl.   Continue to monitor maternal/fetal status closely  Anticipate progress and vaginal birth

## 2023-08-23 NOTE — PROGRESS NOTES
[facility] - [facility]  Obstetrics  Labor Progress Note    Patient Name: Carolyn Mcfarlane  MRN: [id number]  Admission Date: 2023  Hospital Length of Stay: 0 days  Attending Physician: Caterina Cabral MD  Primary Care Provider: Florina, Primary Doctor    Subjective:     Principal Problem:Amniotic fluid leaking    Hospital Course:  Admit for labor augmentation  Vancomycin for GBS      No new subjective & objective note has been filed under this hospital service since the last note was generated.    Assessment/Plan:     38 y.o. female  at 39w1d for:    * Amniotic fluid leaking  Admit for labor augmentation  Pitocin  Epidural per pt request  Continue to monitor maternal/fetal status closely  Anticipate progress and vaginal birth    Failure to progress in labor  2023  No cervical change for 3+ hrs  iupc in place  Unable to increase pitocin due to decels  When pitocin off, fht reassuring but contractions space out to 5-7 cm  Patient ready to proceed with primary c/section  Reviewed risks of c/section, including but not limited to:   infection, bleeding damage to bowel, bladder, dvt, htn, cva; damage to fetus.  All questions answered  Consent signed and witnessed; OR/anesthesia aware  Ancef/azithromycin preop (pt reports she can take these antibiotics    GBS +  Vancomycin in labor    Anemia of mother in pregnancy, antepartum, third trimester  Continue iron PP    Polyhydramnios in third trimester  Leaking large amount of clear fluid      Depression, unspecified  Will monitor            Valeri Noel MD  Obstetrics  [facility]

## 2023-08-23 NOTE — TRANSFER OF CARE
Anesthesia Transfer of Care Note    Patient: Carolyn Mcfarlane    Procedure(s) Performed: Procedure(s) (LRB):   SECTION (N/A)    Patient location: Labor and Delivery    Anesthesia Type: epidural    Transport from OR: Transported from OR on room air with adequate spontaneous ventilation    Post pain: adequate analgesia    Post assessment: no apparent anesthetic complications and tolerated procedure well    Post vital signs: stable    Level of consciousness: awake, alert and oriented    Nausea/Vomiting: no nausea/vomiting    Complications: none    Transfer of care protocol was followed      Last vitals:   Visit Vitals  /70   Pulse 90   Temp 36.6 °C (97.8 °F) (Oral)   Resp 18   Ht 5' (1.524 m)   Wt 78.1 kg (172 lb 2.9 oz)   SpO2 100%   Breastfeeding No   BMI 33.63 kg/m²

## 2023-08-23 NOTE — LACTATION NOTE
This note was copied from a baby's chart.  Lactation rounds:    Mother states that she is formula feeding and breastfeeding, because she does not have any milk. She reports that breastfeeding has been uncomfortable. Discussed the adequacy of colostrum and baby belly size for the first 3 days of life along with expected output.     Lactation packet reviewed for days 1-2.  Discussed early feeding cues and encouraged mother to feed baby in response to those cues. Encouraged on demand feedings and skin to skin.  Reviewed normal feeding expectations of 8 or more feedings per 24 hour period, cues that babies use to signal hunger and satiety and cluster feeding.     Baby is showing feeding cues. Helped mother to settle in a cross cradle hold position on the right breast. Reviewed deep asymmetric latch and proper positioning. Mother is able to demonstrate back and deep latch easily obtained. Audible swallows noted, and mother denies pain or discomfort. Baby fed until content, and nipple shape and color is WDL upon unlatching.     Mother states that she does not have a breast pump at home. LDH form signed and faxed to [facility] at this time to obtain breast pump from insurance.     Mother denies any further lactation needs or concerns at this time. Encouraged mother to call for assistance when desired or when infant is showing signs of hunger. Lactation availability discussed. Mother verbalizes understanding of all education and counseling.

## 2023-08-23 NOTE — ANESTHESIA POSTPROCEDURE EVALUATION
Anesthesia Post Evaluation    Patient: Carolyn Mcfarlane    Procedure(s) Performed: Procedure(s) (LRB):   SECTION (N/A)    Final Anesthesia Type: epidural      Patient location during evaluation: labor & delivery  Patient participation: Yes- Able to Participate  Level of consciousness: awake and alert and oriented  Post-procedure vital signs: reviewed and stable  Pain management: adequate  Airway patency: patent  LURDES mitigation strategies: Use of major conduction anesthesia (spinal/epidural) or peripheral nerve block  PONV status at discharge: No PONV  Anesthetic complications: no      Cardiovascular status: hemodynamically stable  Respiratory status: spontaneous ventilation and room air  Hydration status: euvolemic  Follow-up not needed.          Vitals Value Taken Time   /79 23 2131   Temp 36.6 °C (97.8 °F) 23 0545   Pulse 110 23 2146   Resp 18 23 0545   SpO2 100 % 236   Vitals shown include unvalidated device data.      No case tracking events are documented in the log.      Pain/Mark Score: No data recorded

## 2023-08-23 NOTE — PROGRESS NOTES
LABOR NOTE    S:  Pt doing OK. Reports pain on her right side.    O: /70   Pulse 90   Temp 97.8 °F (36.6 °C) (Oral)   Resp 18   Ht 5' (1.524 m)   Wt 78.1 kg (172 lb 2.9 oz)   SpO2 100%   Breastfeeding No   BMI 33.63 kg/m²     GENERAL: Calm and appropriate affect  NEURO: Alert, oriented, normal speech  ABDOMEN: Nontender, Fundus palpates soft between UC's.  FHT: Cat 2, reassuring. Intermittent variables. BTBV moderate with + 15x15 accels.   CTX: ~q 2-5 minutes  SVE: 780/-2, IUPC placed       ASSESSMENT:   38 y.o.  IUP at 39w1d, FHT reassuring/ Cat 1    Patient Active Problem List   Diagnosis    Asthma affecting pregnancy in second trimester    Anxiety    AMA (advanced maternal age) multigravida 35+, third trimester    Depression, unspecified    Marginal insertion of umbilical cord affecting management of mother in second trimester    Polyhydramnios in third trimester    Anemia of mother in pregnancy, antepartum, third trimester    GBS +    Amniotic fluid leaking         PLAN:    CRNA called for epidural re-dose   Pitocin Augmentation per protocol - currently infusing at 2mu. Increase as baby tolerates.  Continue to monitor maternal/fetal status closely  Anticipate progress and vaginal birth

## 2023-08-23 NOTE — SUBJECTIVE & OBJECTIVE
Interval History:     She is doing well this morning. She is tolerating a regular diet without nausea or vomiting. She has Muhammad catheter in place.  She is not ambulating. She has not passed flatus, and has not a BM. Vaginal bleeding is mild. She denies fever or chills. Abdominal pain is mild and controlled with oral medications. She Is breastfeeding and bottle feeding. She desires circumcision for her male baby: not applicable.    Objective:     Vital Signs (Most Recent):  Temp: 97.8 °F (36.6 °C) (08/23/23 0424)  Pulse: 81 (08/23/23 0424)  Resp: 18 (08/23/23 0424)  BP: 109/68 (08/23/23 0424)  SpO2: 98 % (08/23/23 0424) Vital Signs (24h Range):  Temp:  [97.6 °F (36.4 °C)-97.8 °F (36.6 °C)] 97.8 °F (36.6 °C)  Pulse:  [] 81  Resp:  [18] 18  SpO2:  [96 %-100 %] 98 %  BP: ()/() 109/68     Weight: 78.1 kg (172 lb 2.9 oz)  Body mass index is 33.63 kg/m².      Intake/Output Summary (Last 24 hours) at 8/23/2023 0712  Last data filed at 8/23/2023 0215  Gross per 24 hour   Intake 1155.6 ml   Output 2570 ml   Net -1414.4 ml         Significant Labs:  Lab Results   Component Value Date    GROUPTRH O POS 08/22/2023    HEPBSAG Non-reactive 06/08/2023    STREPBCULT (A) 07/26/2023     STREPTOCOCCUS AGALACTIAE (GROUP B)  In case of Penicillin allergy, call lab for further testing.  Beta-hemolytic streptococci are routinely susceptible to   penicillins,cephalosporins and carbapenems.       Recent Labs   Lab 08/22/23  0611   HGB 8.7*   HCT 27.7*       I have personallly reviewed all pertinent lab results from the last 24 hours.    Physical Exam:   Constitutional: She is oriented to person, place, and time. She appears well-developed and well-nourished. No distress.       Cardiovascular:  Normal rate, regular rhythm, normal heart sounds and intact distal pulses.            No murmur heard.   Pulmonary/Chest: Effort normal and breath sounds normal. No respiratory distress. She has no wheezes. She has no rales.         Abdominal: Soft. Bowel sounds are normal. She exhibits abdominal incision (Pressure dressing in place, clear/dry/intact). She exhibits no distension. There is no abdominal tenderness. There is no guarding.   Uterine fundus firm, below umbilicus, mild tenderness (appropriate)             Musculoskeletal: Moves all extremeties. No edema.      Comments: No calf tenderness       Neurological: She is alert and oriented to person, place, and time.    Skin: Skin is warm and dry. No rash noted. She is not diaphoretic.

## 2023-08-23 NOTE — ASSESSMENT & PLAN NOTE
08/22/2023  No cervical change for 3+ hrs  iupc in place  Unable to increase pitocin due to decels  When pitocin off, fht reassuring but contractions space out to 5-7 cm  Patient ready to proceed with primary c/section  Reviewed risks of c/section, including but not limited to:   infection, bleeding damage to bowel, bladder, dvt, htn, cva; damage to fetus.  All questions answered  Consent signed and witnessed; OR/anesthesia aware  Ancef/azithromycin preop (pt reports she can take these antibiotics

## 2023-08-23 NOTE — PLAN OF CARE
Pt progressing well. Due to void since salcido cath removal at 1000. Pt states she refuses an in and out cath. Will bladder scan to assess. Vitals stable.

## 2023-08-23 NOTE — L&D DELIVERY NOTE
[facility] Delivery   Section   Operative Note    SUMMARY     Date of Procedure: 2023     Procedure: Procedure(s) (LRB):   SECTION (N/A)    Surgeon(s) and Role:     * Valeri Noel MD - Primary    Assisting Surgeon: parag mena    Pre-Operative Diagnosis: Failure to Progress  Fetal Intolerance of Labor    Post-Operative Diagnosis: Post-Op Diagnosis Codes:     * Pregnant [Z34.90]    Anesthesia: Spinal/Epidural    Technical Procedures Used: primary low transverse  section           Description of the Findings of the Procedure: vertex wedged in pelvis, female infant, large caput, clear fluid; normal adnexa;     Significant Surgical Tasks Conducted by the Assistant(s), if Applicable: 1st assist    Complications: No    Blood Loss: * No values recorded between 2023 12:00 AM and 2023 11:08 PM *600     With patient in supine position, the legs are  and Muhammad Catheter placed and positioning to supine done. Vaginal prep done prior to entry into the OR.  Abdomen prepped with Chloroprep and 3 minute drying time allowed prior to draping of the abdomen.   Time out taken with OR team members.  Pfannenstiel Incision made through the skin, transverse fascial incision developed, rectus muscles  in the midline and the peritoneum entered.   no adhesions noted.  The lower uterine segment and position of the fetus identified.   Bladder flap taken down through transverse peritoneal incision.    Low Transverse Incision made through well developed lower uterine segment and extended laterally with blunt dissection.   Clear fluid noted.  Infant delivered from vertex presentation.  Cord clamped after one minute and  handed to attending nurse.  Cord blood taken, placenta delivered.  The uterus was exteriorized.  The edges of the uterine incision are grasped with Rich clamps at the angles and the inferior and superior midline edges of the incision.    Closure with  running lock 1 monocryl, starting at each angle, tying in the midline.   Observation for bleeding with suture of any bleeding along the hysterotomy line.     Bloody urine noted in catheter; bladder inspected (anterior, dome, posterior;); no defect noted; urine noted to spontaneously clear.    The uterus was then returned to the abdomen, gutters cleared of all clots and debris.  The hysterotomy incisions were examined and noted to be hemostatic.    With good hemostasis noted, the anterior pelvis is rinsed with sterile saline.   Right and left adnexa with normal anatomy.     Closure of the abdomen with 2 0 Vicryl running of the peritoneum,    The fascia was closed with 0 vicryl starting at each angle and tying the knot in the midline after locking the first.  Subcutaneous tissue was copiously irrigated and made hemostatic with cautery.    Subcutaneous space closed with 2-0 plain      Skin closure with 4 0 monocryl subcuticular.  Wound dressed with aquasel and pressure dressing applied.          Specimens:   Specimen (24h ago, onward)      None            Condition: Good    VTE Risk Mitigation (From admission, onward)           Ordered     IP VTE LOW RISK PATIENT  Once         23 0541                    Disposition: PACU - hemodynamically stable.    Attestation: Good         Delivery Information for Stanislav Mcfarlane    Birth information:  YOB: 2023   Time of birth: 10:16 PM   Sex: female   Head Delivery Date/Time: 2023 10:16 PM   Delivery type:    Gestational Age: 39w1d        Delivery Providers    Delivering clinician: Valeri Noel MD   Provider Role    Janice Esposito RN Baird, Gregory S., CRNA Pezant, Allison, RN Parker, Shelby, ST Legaux, Lisa M., ST               Measurements    Weight: 3210 g  Weight (lbs): 7 lb 1.2 oz  Length:          Apgars    Living status: Living  Apgar Component Scores:  1 min.:  5 min.:  10 min.:  15 min.:  20 min.:    Skin color:  0   1       Heart rate:  2  2       Reflex irritability:  2  2       Muscle tone:  2  2       Respiratory effort:  2  2       Total:  8  9                Operative Delivery    Forceps attempted?: No  Vacuum extractor attempted?: No         Shoulder Dystocia    Shoulder dystocia present?: No           Presentation    Presentation: Vertex           Interventions/Resuscitation           Cord    Vessels: 3 vessels  Complications: None  Delayed Cord Clamping?: Yes  Cord Clamped Date/Time: 2023 10:19 PM  Cord Blood Disposition: Lab  Gases Sent?: No  Stem Cell Collection (by MD): No       Placenta    Placenta delivery date/time: 2023  Placenta removal: Manual removal  Placenta appearance: Intact  Placenta disposition: Discarded           Labor Events:       labor:       Labor Onset Date/Time:         Dilation Complete Date/Time:         Start Pushing Date/Time:         Start Pushing Date/Time:       Rupture Date/Time: 23  044         Rupture type: SRM (Spontaneous Rupture)         Fluid Amount:       Fluid Color: Clear               steroids:       Antibiotics given for GBS:       Induction:       Indications for induction:        Augmentation:       Indications for augmentation:       Labor complications:       Additional complications:          Cervical ripening:                     Delivery:      Episiotomy:       Indication for Episiotomy:       Perineal Lacerations:   Repaired:      Periurethral Laceration:   Repaired:     Labial Laceration:   Repaired:     Sulcus Laceration:   Repaired:     Vaginal Laceration:   Repaired:     Cervical Laceration:   Repaired:     Repair suture:       Repair # of packets:       Last Value - EBL - Nursing (mL):       Sum - EBL - Nursing (mL): 0     Last Value - EBL - Anesthesia (mL):      Calculated QBL (mL):       Vaginal Sweep Performed:       Surgicount Correct:       Vaginal Packing:   Quantity:       Other providers:       Anesthesia    Method:  Epidural          Details (if applicable):  Trial of Labor      Categorization:      Priority:     Indications for :     Incision Type:       Additional  information:  Forceps:    Vacuum:    Breech:    Observed anomalies    Other (Comments):

## 2023-08-23 NOTE — PROGRESS NOTES
[facility] ([facility])  Obstetrics  Postpartum Progress Note    Patient Name: Carolyn Mcfarlane  MRN: [id number]  Admission Date: 2023  Hospital Length of Stay: 1 days  Attending Physician: Caterina Cabral MD  Primary Care Provider: Florina, Primary Doctor    Subjective:     Principal Problem:S/P  section    Hospital Course:  Admit for labor augmentation  Vancomycin for GBS  2023--maximum dilation of 7 cm;pitocin having to be stopped multiple times due to fetal heart tones; when pitocin off, fht very reassuring but contractions space out to 5-7 cm; pt requesting and ready to proceed with primary c/seciton  Patient underwent primary  without complication.  Transferred to mother baby unit for routine post partum care. Patient progressed well postoperatively without complication.           Interval History:     She is doing well this morning. She is tolerating a regular diet without nausea or vomiting. She has Muhammad catheter in place.  She is not ambulating. She has not passed flatus, and has not a BM. Vaginal bleeding is mild. She denies fever or chills. Abdominal pain is mild and controlled with oral medications. She Is breastfeeding and bottle feeding. She desires circumcision for her male baby: not applicable.    Objective:     Vital Signs (Most Recent):  Temp: 97.8 °F (36.6 °C) (23)  Pulse: 81 (23)  Resp: 18 (23)  BP: 109/68 (23)  SpO2: 98 % (23) Vital Signs (24h Range):  Temp:  [97.6 °F (36.4 °C)-97.8 °F (36.6 °C)] 97.8 °F (36.6 °C)  Pulse:  [] 81  Resp:  [18] 18  SpO2:  [96 %-100 %] 98 %  BP: ()/() 109/68     Weight: 78.1 kg (172 lb 2.9 oz)  Body mass index is 33.63 kg/m².      Intake/Output Summary (Last 24 hours) at 2023 0712  Last data filed at 2023 0215  Gross per 24 hour   Intake 1155.6 ml   Output 2570 ml   Net -1414.4 ml         Significant Labs:  Lab Results   Component Value Date    ADONAY OSUNA  2023    HEPBSAG Non-reactive 2023    STREPBCULT (A) 2023     STREPTOCOCCUS AGALACTIAE (GROUP B)  In case of Penicillin allergy, call lab for further testing.  Beta-hemolytic streptococci are routinely susceptible to   penicillins,cephalosporins and carbapenems.       Recent Labs   Lab 23  0611   HGB 8.7*   HCT 27.7*       I have personallly reviewed all pertinent lab results from the last 24 hours.    Physical Exam:   Constitutional: She is oriented to person, place, and time. She appears well-developed and well-nourished. No distress.       Cardiovascular:  Normal rate, regular rhythm, normal heart sounds and intact distal pulses.            No murmur heard.   Pulmonary/Chest: Effort normal and breath sounds normal. No respiratory distress. She has no wheezes. She has no rales.        Abdominal: Soft. Bowel sounds are normal. She exhibits abdominal incision (Pressure dressing in place, clear/dry/intact). She exhibits no distension. There is no abdominal tenderness. There is no guarding.   Uterine fundus firm, below umbilicus, mild tenderness (appropriate)             Musculoskeletal: Moves all extremeties. No edema.      Comments: No calf tenderness       Neurological: She is alert and oriented to person, place, and time.    Skin: Skin is warm and dry. No rash noted. She is not diaphoretic.             Assessment/Plan:     38 y.o. female  for:    * S/P  section  POD #1 s/p primary LTCS  Pt doing well, afebrile overnight.  Proceed with routine post-partum care, encouraged ambulation, aware ok to shower.  Pt counseled on management plan and discharge goals. Anticipate discharge in 1-2 days            Failure to progress in labor  S/p PCS    Amniotic fluid leaking  Admit for labor augmentation  Pitocin  Epidural per pt request  Continue to monitor maternal/fetal status closely  Anticipate progress and vaginal birth    GBS +  Vancomycin in labor    Anemia of mother in pregnancy,  antepartum, third trimester  Continue iron PP    Polyhydramnios in third trimester  Leaking large amount of clear fluid      Depression, unspecified  Monitor post partum          Disposition: As patient meets milestones, will plan to discharge.    Leilani Peace PA-C  Obstetrics  [facility] ([facility])

## 2023-08-23 NOTE — PLAN OF CARE
POC reviewed with patient. Fundus firm without massage and below umbilicus. Bleeding light, no clots passed this shift. Incision dressing intact. Muhammad catheter in place. SCDs on. Bedrest until 10am. Pain well controlled with IV pain medication. VSS. Bonding with infant; responds to infant cues and participates in infant care. Call light placed within patient reach, encouraged use if needed.

## 2023-08-23 NOTE — ASSESSMENT & PLAN NOTE
POD #1 s/p primary LTCS  Pt doing well, afebrile overnight.  Proceed with routine post-partum care, encouraged ambulation, aware ok to shower.  Pt counseled on management plan and discharge goals. Anticipate discharge in 1-2 days

## 2023-08-24 PROCEDURE — 25000003 PHARM REV CODE 250: Performed by: OBSTETRICS & GYNECOLOGY

## 2023-08-24 PROCEDURE — 11000001 HC ACUTE MED/SURG PRIVATE ROOM

## 2023-08-24 PROCEDURE — 63600175 PHARM REV CODE 636 W HCPCS: Performed by: OBSTETRICS & GYNECOLOGY

## 2023-08-24 PROCEDURE — A4216 STERILE WATER/SALINE, 10 ML: HCPCS | Performed by: OBSTETRICS & GYNECOLOGY

## 2023-08-24 PROCEDURE — 99900035 HC TECH TIME PER 15 MIN (STAT)

## 2023-08-24 RX ADMIN — HYDROCODONE BITARTRATE AND ACETAMINOPHEN 1 TABLET: 5; 325 TABLET ORAL at 02:08

## 2023-08-24 RX ADMIN — HYDROCODONE BITARTRATE AND ACETAMINOPHEN 1 TABLET: 5; 325 TABLET ORAL at 09:08

## 2023-08-24 RX ADMIN — HYDROCODONE BITARTRATE AND ACETAMINOPHEN 1 TABLET: 5; 325 TABLET ORAL at 12:08

## 2023-08-24 RX ADMIN — IBUPROFEN 800 MG: 800 TABLET, FILM COATED ORAL at 02:08

## 2023-08-24 RX ADMIN — DOCUSATE SODIUM 100 MG: 100 CAPSULE, LIQUID FILLED ORAL at 09:08

## 2023-08-24 RX ADMIN — PRENATAL VITAMINS-IRON FUMARATE 27 MG IRON-FOLIC ACID 0.8 MG TABLET 1 TABLET: at 09:08

## 2023-08-24 RX ADMIN — KETOROLAC TROMETHAMINE 30 MG: 30 INJECTION, SOLUTION INTRAMUSCULAR; INTRAVENOUS at 12:08

## 2023-08-24 RX ADMIN — IBUPROFEN 800 MG: 800 TABLET, FILM COATED ORAL at 09:08

## 2023-08-24 RX ADMIN — HYDROCODONE BITARTRATE AND ACETAMINOPHEN 1 TABLET: 7.5; 325 TABLET ORAL at 04:08

## 2023-08-24 RX ADMIN — SODIUM CHLORIDE 2 ML: 9 INJECTION, SOLUTION INTRAMUSCULAR; INTRAVENOUS; SUBCUTANEOUS at 12:08

## 2023-08-24 RX ADMIN — HYDROCODONE BITARTRATE AND ACETAMINOPHEN 1 TABLET: 7.5; 325 TABLET ORAL at 09:08

## 2023-08-24 RX ADMIN — SENNOSIDES AND DOCUSATE SODIUM 1 TABLET: 50; 8.6 TABLET ORAL at 09:08

## 2023-08-24 RX ADMIN — IBUPROFEN 800 MG: 800 TABLET, FILM COATED ORAL at 06:08

## 2023-08-24 NOTE — ASSESSMENT & PLAN NOTE
POD #2 s/p primary LTCS  Pt doing well, afebrile overnight.  Proceed with routine post-partum care, encouraged ambulation, aware ok to shower.  Pt counseled on management plan and discharge goals. Will have SW follow up with patient regarding outpatient resources.

## 2023-08-24 NOTE — LACTATION NOTE
This note was copied from a baby's chart.  Lactation called to room:    Baby is showing feeding cues. Helped mother to settle in a football hold position on the left breast. Reviewed deep asymmetric latch and proper positioning. Mother is able to demonstrate back and deep latch easily obtained. Audible swallows noted, and mother denies pain or discomfort. Baby fed until content, and nipple shape and color is WDL upon unlatching.     Infant begins showing feeding cues again. Mother independently latches infant to right breast in football hold on the right breast. Audible swallows noted and mother denies pain.     While infant nursing, mother wanted a demonstration on how to use Ameda Nicolle Denise. Pump parts cleaned and sanitized prior to use. Demonstrated pump set up and functions. 25 mm flanges used. Mother denies pain or discomfort. Pumped for 2 minutes on left breast and discontinued. Feeding ongoing.    Mother denies any further lactation needs or concerns at this time. Encouraged mother to call for assistance when desired or when infant is showing signs of hunger. Lactation availability discussed. Mother verbalizes understanding of all education and counseling. Primary nurse updated.

## 2023-08-24 NOTE — PLAN OF CARE
Patient afebrile and had no falls this shift. Fundus firm without massage and below umbilicus. Bleeding light, no clots passed this shift. Voids spontaneously. Ambulates independently. Pain well controlled with oral pain medication. Vital signs stable at this time. Bonding well with infant; responds to infant cues and participates in infant care. No problems indicated at this time. Call light in reach, encourage usage, if needed.

## 2023-08-24 NOTE — CONSULTS
[facility] ([facility])  OB Initial Discharge Assessment       Swer completed discharge planning assessment with pt at bedside. Pt was easily engaged. Education on the role of  was provided. Emotional support provided throughout assessment.     Pt has one other child M-age 12, who currently lives with father out of state. FOB involved, and will be signing birth certificate. Pt reported having car seat and crib, denied having clothes, diapers, wipes, and formula. Swer provided resources for clothes, diapers, and wipes. Swer educated pt on WIC and how to enroll. Swer also provided medicaid transportation information.  Pt denied any SI/HI. No other issues or concerns at this time.     SWER WILL REMAIN AVAILABLE THROUGHOUT PT'S ENTIRE STAY.     Baby's Name; Isabella (Unsure of last name??)   FOB's Name; Jake HARO) [phone] 4/20/1996  WIC; Will Enroll  Pediatrician; Undecided      Primary Care Provider: No, Primary Doctor    Expected Discharge Date: 8/23/2023    Initial Assessment (most recent)       OB Discharge Planning Assessment - 08/24/23 1045          OB Discharge Planning Assessment    Assessment Type Discharge Planning Assessment     Source of Information patient     Verified Demographic and Insurance Information Yes     Insurance Medicaid     Medicaid [facility]     Medicaid Insurance Primary     People in Home significant other     Relationship Status Engaged     Name of Support/Comfort Primary Source Jake Estrada ([facility]) [phone]     Other children (include names and ages) Smith M-12     Employed No     Employer N/A     Job Title N/A     Currently Enrolled in School No     Highest Level of Education High School Diploma     Father's Involvement Fully Involved     Is Father signing the birth certificate Yes     Father's Address same as mother     Father Currently Enrolled in School No     Father's Employer [employer]     Father's Employer Phone  Number [phone]     Father's Job Title [employer] employee for [employer]     Primary Contact Name and Number Jake Estrada (FOB) [phone]     Received Prenatal Care Yes     Transportation Anticipated family or friend will provide     Receive WIC Benefits Already certified, will apply for new born     Adoption Planned no     Infant Feeding Plan breastfeeding;formula feeding     Previous Breastfeeding Experience no     Breast Pump Needed no     Does baby have crib or safe sleep space? Yes     Do you have a car seat? Yes     Pediatrician Undecided     Resource/Environmental Concerns none     Equipment Currently Used at Home none     DME Needed Upon Discharge  none     DCFS No indications (Indicators for Report)     Discharge Plan A Home     Do you have any problems affording any of your prescribed medications? No        Physical Activity    On average, how many days per week do you engage in moderate to strenuous exercise (like a brisk walk)? 0 days     On average, how many minutes do you engage in exercise at this level? 0 min        Financial Resource Strain    How hard is it for you to pay for the very basics like food, housing, medical care, and heating? Somewhat hard        Housing Stability    In the last 12 months, was there a time when you were not able to pay the mortgage or rent on time? No     In the last 12 months, how many places have you lived? 1     In the last 12 months, was there a time when you did not have a steady place to sleep or slept in a shelter (including now)? No        Transportation Needs    In the past 12 months, has lack of transportation kept you from medical appointments or from getting medications? Yes     In the past 12 months, has lack of transportation kept you from meetings, work, or from getting things needed for daily living? Yes        Food Insecurity    Within the past 12 months, you worried that your food would run out before you got the money to buy more. Never true      Within the past 12 months, the food you bought just didn't last and you didn't have money to get more. Never true        Stress    Do you feel stress - tense, restless, nervous, or anxious, or unable to sleep at night because your mind is troubled all the time - these days? Not at all        Social Connections    In a typical week, how many times do you talk on the phone with family, friends, or neighbors? More than three times a week     How often do you get together with friends or relatives? More than three times a week     How often do you attend Adventist or Scientologist services? Never     Do you belong to any clubs or organizations such as Adventist groups, unions, fray prime or athletic groups, or school groups? No     How often do you attend meetings of the clubs or organizations you belong to? Never     Are you , , , , never , or living with a partner? Living with partner        Alcohol Use    Q1: How often do you have a drink containing alcohol? Never     Q2: How many drinks containing alcohol do you have on a typical day when you are drinking? Patient does not drink     Q3: How often do you have six or more drinks on one occasion? Never        Infant Feeding Plan    Formula Preference no preference     Nipple Preference no preference        Breastfeeding Supplementation    Infant Indication for Supplementation maternal request                   Psychosocial (most recent)       OB Psychosocial Assessment - 08/24/23 1049          OB Psychosocial Assessment    Current or Previous  Service none     Anxieties, Fears or Concerns denied     Major Change/Loss/Stressor/Fears denies     Feels Unsafe at Home or Work/School no     Feels Threatened by Someone no     Does anyone try to keep you from having contact with others or doing things outside your home? no     Physical Signs of Abuse Present no     Have You Felt Down, Depressed or Hopeless? no     Have You Felt Little  Interest or Pleasure in Doing Things? no     Feels Like Hurting Self None     Feels Like Hurting Others no     Have you ever experienced a traumatic event? no     Have you ever witnessed a violent act? no     Have you ever experienced a life threatening injury or near death encounter? no     Current/Active Substance Abuse No     Current/Active Behavioral Health Issues No                          Healthcare Directives:   Advance Directive  (If Adv Dir status is received, view document under Adv Dir in header or Chart Review Media tab): Patient does not have Advance Directive, declines information.

## 2023-08-24 NOTE — LACTATION NOTE
Lactation Rounds:   Mother reports that she is doing breast and formula feeding. Infant last ate at 7 pm; she took formula.     Reviewed with mother expected  behaviors and output for the first 48 hours of life. Discussed the importance of cue based feedings on demand, unrestricted access to the breast, and frequent uninterrupted skin to skin contact. Risk and implications of artificial nipples and non medically indicated formula supplementation discussed.      Discussed benefits of breastfeeding and breast milk to her . Discussed mechanism to promote and maintain mill production. Discussed adequacy of colostrum. Instructed mother on normal  feeding and sleeping patterns. Encouraged mother to breastfeed infant a minimum of 8 times in 24 hours prior to supplementation to promote appropriate breast stimulation for adequate milk supply. Because baby is being supplemented away from the breast, mother was:   - informed that breastfeeding support and assistance is available as needed  - encouraged to express milk from both breasts each time a supplement is given  - encouraged to use her own collected milk as a first choice for supplementation  Mother was encouraged to request assistance as needed and voices understanding.     Mother denies any further lactation needs or concerns at this time. Lactation availability discussed and offered latching assistance with the next feeding. Encouraged mother to call for assistance when desired or when infant is showing signs of hunger. Mother verbalizes understanding of all education and counseling.

## 2023-08-24 NOTE — LACTATION NOTE
This note was copied from a baby's chart.  Lactation rounds: Infant weight loss -4%. Infant output WNL.    Mother reports that she is mostly formula feeding infant. She states that baby only latches well when lactation in room. Discussed proper latch and positioning techniques. Instructed mother to call for next feeding for latch assistance.    Reinforced infant feeding & output pattern, cue based feeds & unrestricted access to the breast. Instructed mother to feed 8 or more times in 24 hours. Cluster feeding discussed and reviewed importance of feeding on demand. Hand expression and nipple care reviewed. Benefits of skin to skin and rooming in discussed.    JumpPost breast pump delivered to mother at this time. Form signed, receipt given to mother, and signed form returned to E office. Instructed mother to wash pump parts thoroughly and sanitize prior to use. Mother verbalizes understanding.      Mother denies any further lactation needs or concerns at this time. Encouraged mother to call for assistance when desired or when infant is showing signs of hunger. Lactation availability discussed. Mother verbalizes understanding of all education and counseling.

## 2023-08-24 NOTE — PLAN OF CARE
Pt progressing well. Up ad tavares and voiding without difficulty. Pain controlled with po meds. Vitals stable. Bonding with baby.

## 2023-08-24 NOTE — PROGRESS NOTES
Bladder scan 397 mls. Pt agreed to in and out cath. 400 mls of clear yellow urine collected at 1915.

## 2023-08-24 NOTE — PROGRESS NOTES
[facility] ([facility])  Obstetrics  Postpartum Progress Note    Patient Name: Carolyn Mcfarlane  MRN: [id number]  Admission Date: 2023  Hospital Length of Stay: 2 days  Attending Physician: Caterina Cabral MD  Primary Care Provider: Florina, Primary Doctor    Subjective:     Principal Problem:Status post primary low transverse  section    Hospital Course:  Admit for labor augmentation  Vancomycin for GBS  2023--maximum dilation of 7 cm;pitocin having to be stopped multiple times due to fetal heart tones; when pitocin off, fht very reassuring but contractions space out to 5-7 cm; pt requesting and ready to proceed with primary c/seciton  Patient underwent primary  without complication.  Transferred to mother baby unit for routine post partum care. Patient progressed well postoperatively without complication.           Interval History:     She is doing well this morning. She is tolerating a regular diet without nausea or vomiting. She is voiding spontaneously. She is ambulating. She has passed flatus, and has not a BM. Vaginal bleeding is mild. She denies fever or chills. Abdominal pain is mild and controlled with oral medications. She Is breastfeeding. She desires circumcision for her male baby: not applicable.    Objective:     Vital Signs (Most Recent):  Temp: 98.9 °F (37.2 °C) (23 0408)  Pulse: 96 (23 0408)  Resp: 16 (23 0412)  BP: 131/66 (23 0408)  SpO2: 98 % (23 0024) Vital Signs (24h Range):  Temp:  [97.6 °F (36.4 °C)-98.9 °F (37.2 °C)] 98.9 °F (37.2 °C)  Pulse:  [] 96  Resp:  [16-18] 16  SpO2:  [98 %] 98 %  BP: (102-131)/(61-68) 131/66     Weight: 78.1 kg (172 lb 2.9 oz)  Body mass index is 33.63 kg/m².      Intake/Output Summary (Last 24 hours) at 2023 0819  Last data filed at 2023 0301  Gross per 24 hour   Intake --   Output 1600 ml   Net -1600 ml         Significant Labs:  Lab Results   Component [facility] Date    [facility]  "   HEPBSAG Non-reactive 2023    STREPBCULT (A) 2023     STREPTOCOCCUS AGALACTIAE (GROUP B)  In case of Penicillin allergy, call lab for further testing.  Beta-hemolytic streptococci are routinely susceptible to   penicillins,cephalosporins and carbapenems.       No results for input(s): "HGB", "HCT" in the last 48 hours.    I have personallly reviewed all pertinent lab results from the last 24 hours.    Physical Exam:   Constitutional: She is oriented to person, place, and time. She appears well-developed and well-nourished. No distress.       Cardiovascular:  Normal rate, regular rhythm, normal heart sounds and intact distal pulses.            No murmur heard.   Pulmonary/Chest: Effort normal and breath sounds normal. No respiratory distress. She has no wheezes. She has no rales.        Abdominal: Soft. Bowel sounds are normal. She exhibits abdominal incision (Aquacel dressing in place, clear/dry/intact). She exhibits no distension. There is no abdominal tenderness. There is no guarding.   Uterine fundus firm, below umbilicus, mild tenderness (appropriate)             Musculoskeletal: Moves all extremeties. No edema.      Comments: No calf tenderness       Neurological: She is alert and oriented to person, place, and time.    Skin: Skin is warm and dry. No rash noted. She is not diaphoretic.            Assessment/Plan:     38 y.o. female  for:    * Status post primary low transverse  section  POD #2 s/p primary LTCS  Pt doing well, afebrile overnight.  Proceed with routine post-partum care, encouraged ambulation, aware ok to shower.  Pt counseled on management plan and discharge goals. Will have SW follow up with patient regarding outpatient resources.          Failure to progress in labor  S/p PCS    Amniotic fluid leaking  Admit for labor augmentation  Pitocin  Epidural per pt request  Continue to monitor maternal/fetal status closely  Anticipate progress and vaginal birth    GBS " +  Vancomycin in labor    Anemia of mother in pregnancy, antepartum, third trimester  Continue iron PP    Polyhydramnios in third trimester  Leaking large amount of clear fluid      Depression, unspecified  Monitor post partum          Disposition: As patient meets milestones, will plan to discharge.    Leilani Peace PA-C  Obstetrics  [facility] ([facility])

## 2023-08-24 NOTE — SUBJECTIVE & OBJECTIVE
"Interval History:     She is doing well this morning. She is tolerating a regular diet without nausea or vomiting. She is voiding spontaneously. She is ambulating. She has passed flatus, and has not a BM. Vaginal bleeding is mild. She denies fever or chills. Abdominal pain is mild and controlled with oral medications. She Is breastfeeding. She desires circumcision for her male baby: not applicable.    Objective:     Vital Signs (Most Recent):  Temp: 98.9 °F (37.2 °C) (08/24/23 0408)  Pulse: 96 (08/24/23 0408)  Resp: 16 (08/24/23 0412)  BP: 131/66 (08/24/23 0408)  SpO2: 98 % (08/24/23 0024) Vital Signs (24h Range):  Temp:  [97.6 °F (36.4 °C)-98.9 °F (37.2 °C)] 98.9 °F (37.2 °C)  Pulse:  [] 96  Resp:  [16-18] 16  SpO2:  [98 %] 98 %  BP: (102-131)/(61-68) 131/66     Weight: 78.1 kg (172 lb 2.9 oz)  Body mass index is 33.63 kg/m².      Intake/Output Summary (Last 24 hours) at 8/24/2023 0819  Last data filed at 8/24/2023 0301  Gross per 24 hour   Intake --   Output 1600 ml   Net -1600 ml         Significant Labs:  Lab Results   Component Value Date    GROUPTRH O POS 08/22/2023    HEPBSAG Non-reactive 06/08/2023    STREPBCULT (A) 07/26/2023     STREPTOCOCCUS AGALACTIAE (GROUP B)  In case of Penicillin allergy, call lab for further testing.  Beta-hemolytic streptococci are routinely susceptible to   penicillins,cephalosporins and carbapenems.       No results for input(s): "HGB", "HCT" in the last 48 hours.    I have personallly reviewed all pertinent lab results from the last 24 hours.    Physical Exam:   Constitutional: She is oriented to person, place, and time. She appears well-developed and well-nourished. No distress.       Cardiovascular:  Normal rate, regular rhythm, normal heart sounds and intact distal pulses.            No murmur heard.   Pulmonary/Chest: Effort normal and breath sounds normal. No respiratory distress. She has no wheezes. She has no rales.        Abdominal: Soft. Bowel sounds are normal. " She exhibits abdominal incision (Aquacel dressing in place, clear/dry/intact). She exhibits no distension. There is no abdominal tenderness. There is no guarding.   Uterine fundus firm, below umbilicus, mild tenderness (appropriate)             Musculoskeletal: Moves all extremeties. No edema.      Comments: No calf tenderness       Neurological: She is alert and oriented to person, place, and time.    Skin: Skin is warm and dry. No rash noted. She is not diaphoretic.

## 2023-08-25 VITALS
HEIGHT: 60 IN | HEART RATE: 98 BPM | OXYGEN SATURATION: 100 % | SYSTOLIC BLOOD PRESSURE: 125 MMHG | WEIGHT: 172.19 LBS | TEMPERATURE: 99 F | BODY MASS INDEX: 33.8 KG/M2 | RESPIRATION RATE: 18 BRPM | DIASTOLIC BLOOD PRESSURE: 73 MMHG

## 2023-08-25 PROCEDURE — 99900035 HC TECH TIME PER 15 MIN (STAT)

## 2023-08-25 PROCEDURE — 25000003 PHARM REV CODE 250: Performed by: OBSTETRICS & GYNECOLOGY

## 2023-08-25 RX ORDER — DOCUSATE SODIUM 100 MG/1
100 CAPSULE, LIQUID FILLED ORAL 2 TIMES DAILY
Qty: 30 CAPSULE | Refills: 0 | Status: SHIPPED | OUTPATIENT
Start: 2023-08-25

## 2023-08-25 RX ORDER — HYDROCODONE BITARTRATE AND ACETAMINOPHEN 5; 325 MG/1; MG/1
1 TABLET ORAL EVERY 4 HOURS PRN
Qty: 15 TABLET | Refills: 0 | Status: SHIPPED | OUTPATIENT
Start: 2023-08-25

## 2023-08-25 RX ORDER — IBUPROFEN 800 MG/1
800 TABLET ORAL EVERY 8 HOURS
Qty: 30 TABLET | Refills: 0 | Status: SHIPPED | OUTPATIENT
Start: 2023-08-25

## 2023-08-25 RX ADMIN — DOCUSATE SODIUM 100 MG: 100 CAPSULE, LIQUID FILLED ORAL at 09:08

## 2023-08-25 RX ADMIN — FERROUS SULFATE TAB 325 MG (65 MG ELEMENTAL FE) 1 EACH: 325 (65 FE) TAB at 09:08

## 2023-08-25 RX ADMIN — CETIRIZINE HYDROCHLORIDE 10 MG: 10 TABLET, FILM COATED ORAL at 09:08

## 2023-08-25 RX ADMIN — PRENATAL VITAMINS-IRON FUMARATE 27 MG IRON-FOLIC ACID 0.8 MG TABLET 1 TABLET: at 09:08

## 2023-08-25 RX ADMIN — IBUPROFEN 800 MG: 800 TABLET, FILM COATED ORAL at 06:08

## 2023-08-25 RX ADMIN — HYDROCODONE BITARTRATE AND ACETAMINOPHEN 1 TABLET: 7.5; 325 TABLET ORAL at 09:08

## 2023-08-25 NOTE — DISCHARGE INSTRUCTIONS
"Mother Self Care:    Activity: Avoid strenuous exercise and get adequate rest.  No driving until the physician consent given.  Emotional Changes: Most women find birth to be a time of great emotional upheaval.  Sense of loss, mood swings, fatigue, anxiety, and feeling "let down" are common.  If feelings worsen or last more than a week, call your physician.  Breast Care/Breastfeeding: Wear a bra for comfort.  Keep nipples dry and apply your own breast milk or lanolin cream as needed for soreness.  Engorgement can be relieved with warm, moist heat before feedings.  You may also take Ibuprofen.  Ayesha-Care/Vaginal Bleeding: Remember to use your ayesha-bottle after urinating.  Your flow will change from red, to pink, to yellow/white color over a period of 2 weeks.  Menstruation will return in 3-8 weeks, or longer if breastfeeding.   Section: Keep incision clean and dry. You may shower, but avoid baths. Please continue to use Nozin twice a day for 7 days after surgery. Ensure you attend your 1 week post op visit to have your dressing removed. Use antibacterial soap to wash your entire body until directed otherwise by a Physician, it is very important to shower daily.   Sexual Activity/Pelvic Rest: No sexual activity, tampons, or douching until your physician gives you consent.  Diet: Continue to eat from the five basic food groups, including plenty of protein, fruits, vegetables, and whole grains.  Limit empty calories and high fat foods.  Drink enough fluids to satisfy thirst and add an extra 500 calories for breastfeeding.  Constipation/Hemorrhoids: Drink plenty of water.  You may take a stool softener or natural laxative (Metamucil). You may use tucks or hemorrhoid ointment and soak in a warm tub.    CALL YOUR OB DOCTOR IF ANY OF THE FOLLOWING OCCURS:  *Heavy bleeding - saturating a pad an hour or passing any large (2-3 inches in size) blood clots.  *Any pain, redness, or tenderness in lower leg.  *You cannot " care for yourself or your baby.  *Any signs of infection-      - Temperature greater than 100.5 degrees F      - Foul smelling vaginal discharge and/or incisional drainage      - Increased incisional pain      - Hot, hard, red or sore area on breast      - Flu-like symptoms      - Any urgency, frequency or burning with urination    Return To the Hospital for further Evaluation:  Headache not relieved by tylenol or ibuprofen  Blurry vision, double vision, seeing spots, or flashing lights  Feeling faint or passing out  Right epigastric pain  Difficulty breathing  Swelling in hands, face, or feet  Any of these symptoms accompanied by nausea/vomiting  Gaining more than 5 pounds in one week  Seizures  These symptoms could be an indication of elevated blood pressure.     For patients that were treated for high blood pressure during pregnancy and or your hospital stay you will need a blood pressure check three days after you leave the hospital. Your nursing staff will assist you in an appointment if needed. If you have Connected Mom you may use your blood pressure cuff and report any readings 140/90 to your provider immediately.       If you have any questions that need to be answered immediately please call the Labor & Delivery Unit at [phone] and ask to speak to a nurse.      Please see [facility] BLUE folder for additional information and handouts.

## 2023-08-25 NOTE — PLAN OF CARE
Plan of care reviewed. Vital signs stable. Tolerating diet. Voiding and ambulating independently. Mom is responding to feeding cues. Bonding with baby. Mom has voiced concerns about not being able to provide formula, diapers and transportation for her baby when she leaves the hospital. Will continue to monitor.

## 2023-08-25 NOTE — DISCHARGE SUMMARY
[facility] ([facility])  Obstetrics  Discharge Summary      Patient Name: Carolyn Mcfarlane  MRN: [id number]  Admission Date: 2023  Hospital Length of Stay: 3 days  Discharge Date and Time:  2023 9:44 AM  Attending Physician: Caterina Brito MD   Discharging Provider: Leilani Peace PA-C   Primary Care Provider: Florina, Primary Doctor    HPI: Presents with gross ROM          Procedure(s) (LRB):   SECTION (N/A)     Hospital Course:   Admit for labor augmentation  Vancomycin for GBS  2023--maximum dilation of 7 cm;pitocin having to be stopped multiple times due to fetal heart tones; when pitocin off, fht very reassuring but contractions space out to 5-7 cm; pt requesting and ready to proceed with primary c/seciton  Patient underwent primary  without complication.  Transferred to mother baby unit for routine post partum care. Patient progressed well postoperatively without complication.            Consults (From admission, onward)        Status Ordering Provider     Inpatient consult to Social Work  Once        Provider:  (Not yet assigned)    Completed CATERINA BRITO          Final Active Diagnoses:    Diagnosis Date Noted POA    PRINCIPAL PROBLEM:  Status post primary low transverse  section [Z98.891] 2023 Not Applicable    Amniotic fluid leaking [O42.90] 2023 Yes    Failure to progress in labor [O62.2] 2023 Yes    GBS + [O99.820] 2023 Not Applicable    Anemia of mother in pregnancy, antepartum, third trimester [O99.013] 06/15/2023 Yes    Polyhydramnios in third trimester [O40.3XX0] 2023 Yes    Marginal insertion of umbilical cord affecting management of mother in second trimester [O43.192] 2023 Yes    Asthma affecting pregnancy in second trimester [O99.512, J45.909] 2023 Yes    Depression, unspecified [F32.A] 2022 Yes      Problems Resolved During this Admission:        Significant Diagnostic Studies: Labs: All labs  "within the past 24 hours have been reviewed      Feeding Method: bottle    Immunizations     None          Delivery:    Episiotomy: None   Lacerations: None   Repair suture: None   Repair # of packets:     Blood loss (ml):       Birth information:  YOB: 2023   Time of birth: 10:16 PM   Sex: female   Delivery type: , Low Transverse   Gestational Age: 39w1d     Measurements    Weight: 3210 g  Weight (lbs): 7 lb 1.2 oz  Length: 49 cm  Length (in): 19.29"  Head circumference: 34 cm  Chest circumference: 32 cm  Abdominal girth: 31.5 cm         Delivery Clinician: Delivery Providers    Delivering clinician: Valeri Noel MD   Provider Role    Janice Esposito, RN Registered Nurse    Keshawn Williamson CRNA Nurse Anesthetist    Courtney Ramirez RN Registered Nurse    Mounika Peraza, [facility]    Judy Starr, [facility]           Additional  information:  Forceps:    Vacuum:    Breech:    Observed anomalies      Living?:     Apgars    Living status: Living  Apgar Component Scores:  1 min.:  5 min.:  10 min.:  15 min.:  20 min.:    Skin color:  0  1       Heart rate:  2  2       Reflex irritability:  2  2       Muscle tone:  2  2       Respiratory effort:  2  2       Total:  8  9       Apgars assigned by: MATEO RAMIREZ RN         Placenta: Delivered:       appearance    Pending Diagnostic Studies:     None          Discharged Condition: good    Disposition: Home or Self Care    Follow Up:   Follow-up Information     [facility] Follow up in 1 week(s).    Why: dressing removal           Valeri Noel MD Follow up in 4 week(s).    Specialty: Obstetrics and Gynecology  Why: post op check  Contact information:  [address] [place] [id number] [phone]                       Patient Instructions:      BREAST PUMP FOR HOME USE     Order Specific Question Answer Comments   Type of pump: Electric    Weight: 78.1 kg (172 lb 2.9 oz)    Length of need (1-99 months): 99      Diet " Adult Regular     Leave dressing on - Keep it clean, dry, and intact until clinic visit   Order Comments: Dressing will be removed at 1 week nurse visit.  Showers only- no baths for 6 weeks.     No driving until:   Order Comments: No driving until off narcotic medications and able to slam foot on brake quickly without pain.     Pelvic Rest   Order Comments: Pelvic rest x 6 weeks (no tampons, intercourse douching); showers only for 6 wks; no lifting more than baby     Notify your health care provider if you experience any of the following:  temperature >100.4     Notify your health care provider if you experience any of the following:  persistent nausea and vomiting or diarrhea     Notify your health care provider if you experience any of the following:  severe uncontrolled pain     Notify your health care provider if you experience any of the following:  redness, tenderness, or signs of infection (pain, swelling, redness, odor or green/yellow discharge around incision site)     Notify your health care provider if you experience any of the following:  difficulty breathing or increased cough     Notify your health care provider if you experience any of the following:  severe persistent headache     Notify your health care provider if you experience any of the following:  persistent dizziness, light-headedness, or visual disturbances     Notify your health care provider if you experience any of the following:   Order Comments: Heavy vaginal bleeding     Medications:  Current Discharge Medication List      START taking these medications    Details   docusate sodium (COLACE) 100 MG capsule Take 1 capsule (100 mg total) by mouth 2 (two) times daily.  Qty: 30 capsule, Refills: 0      HYDROcodone-acetaminophen (NORCO) 5-325 mg per tablet Take 1 tablet by mouth every 4 (four) hours as needed for Pain.  Qty: 15 tablet, Refills: 0    Comments: Quantity prescribed more than 7 day supply? No      ibuprofen (ADVIL,MOTRIN) 800 MG  tablet Take 1 tablet (800 mg total) by mouth every 8 (eight) hours.  Qty: 30 tablet, Refills: 0         CONTINUE these medications which have NOT CHANGED    Details   albuterol (PROVENTIL) 2.5 mg /3 mL (0.083 %) nebulizer solution Inhale 2.5 mg into the lungs every 6 (six) hours as needed.      albuterol (PROVENTIL/VENTOLIN HFA) 90 mcg/actuation inhaler 2 puffs every 4 (four) hours as needed.      cetirizine (ZYRTEC) 10 MG tablet Take 10 mg by mouth once daily. As needed      ondansetron (ZOFRAN-ODT) 4 MG TbDL Take 1 tablet (4 mg total) by mouth 2 (two) times daily.  Qty: 30 tablet, Refills: 0         STOP taking these medications       aspirin (ECOTRIN) 81 MG EC tablet Comments:   Reason for Stopping:         promethazine (PHENERGAN) 12.5 MG Tab Comments:   Reason for Stopping:               Leilani Peace PA-C  Obstetrics  [facility] ([facility])

## 2023-08-25 NOTE — PROGRESS NOTES
[facility] ([facility])  Obstetrics  Postpartum Progress Note    Patient Name: Carolyn Mcfarlane  MRN: [id number]  Admission Date: 2023  Hospital Length of Stay: 3 days  Attending Physician: Caterina Cabral MD  Primary Care Provider: Florina, Primary Doctor    Subjective:     Principal Problem:Status post primary low transverse  section    Hospital Course:  Admit for labor augmentation  Vancomycin for GBS  2023--maximum dilation of 7 cm;pitocin having to be stopped multiple times due to fetal heart tones; when pitocin off, fht very reassuring but contractions space out to 5-7 cm; pt requesting and ready to proceed with primary c/seciton  Patient underwent primary  without complication.  Transferred to mother baby unit for routine post partum care. Patient progressed well postoperatively without complication.           Interval History:     She is doing well this morning. She is tolerating a regular diet without nausea or vomiting. She is voiding spontaneously. She is ambulating. She has passed flatus, and has not a BM. Vaginal bleeding is mild. She denies fever or chills. Abdominal pain is mild and controlled with oral medications. She Is not breastfeeding. She desires circumcision for her male baby: not applicable.    Objective:     Vital Signs (Most Recent):  Temp: 98 °F (36.7 °C) (23 0805)  Pulse: 98 (23 0805)  Resp: 18 (23 0930)  BP: (!) 140/73 (23 0805)  SpO2: 100 % (23 1655) Vital Signs (24h Range):  Temp:  [97.3 °F (36.3 °C)-98.7 °F (37.1 °C)] 98 °F (36.7 °C)  Pulse:  [79-98] 98  Resp:  [16-18] 18  SpO2:  [100 %] 100 %  BP: (108-140)/(58-73) 140/73     Weight: 78.1 kg (172 lb 2.9 oz)  Body mass index is 33.63 kg/m².    No intake or output data in the 24 hours ending 23 0942      Significant Labs:  Lab Results   Component Value Date    GROUPTRH O POS 2023    HEPBSAG Non-reactive 2023    STREPBCULT (A) 2023     STREPTOCOCCUS  "AGALACTIAE (GROUP B)  In case of Penicillin allergy, call lab for further testing.  Beta-hemolytic streptococci are routinely susceptible to   penicillins,cephalosporins and carbapenems.       No results for input(s): "HGB", "HCT" in the last 48 hours.    I have personallly reviewed all pertinent lab results from the last 24 hours.    Physical Exam:   Constitutional: She is oriented to person, place, and time. She appears well-developed and well-nourished. No distress.       Cardiovascular:  Normal rate, regular rhythm, normal heart sounds and intact distal pulses.            No murmur heard.   Pulmonary/Chest: Effort normal and breath sounds normal. No respiratory distress. She has no wheezes. She has no rales.        Abdominal: Soft. Bowel sounds are normal. She exhibits abdominal incision (Aquacel dressing in place, clear/dry/intact). She exhibits no distension. There is no abdominal tenderness. There is no guarding.   Uterine fundus firm, below umbilicus, mild tenderness (appropriate)             Musculoskeletal: Moves all extremeties. No edema.      Comments: No calf tenderness       Neurological: She is alert and oriented to person, place, and time.    Skin: Skin is warm and dry. No rash noted. She is not diaphoretic.            Assessment/Plan:     38 y.o. female  for:    * Status post primary low transverse  section  POD #3 s/p primary LTCS  Pt doing well, afebrile overnight.  Proceed with routine post-partum care, encouraged ambulation, aware ok to shower.  Pt counseled on management plan and discharge goals. Plan for discharge home today        Failure to progress in labor  S/p PCS    Amniotic fluid leaking  Admit for labor augmentation  Pitocin  Epidural per pt request  Continue to monitor maternal/fetal status closely  Anticipate progress and vaginal birth    GBS +  Vancomycin in labor    Anemia of mother in pregnancy, antepartum, third trimester  Continue iron PP    Polyhydramnios in " third trimester  Leaking large amount of clear fluid      Depression, unspecified  Monitor post partum          Disposition: As patient meets milestones, will plan to discharge today.    Leilani Peace PA-C  Obstetrics  [facility] ([facility])

## 2023-08-25 NOTE — LACTATION NOTE
This note was copied from a baby's chart.  Upon entering room mother verbalized being overwhelmed about getting infant to the breast so she gave infant formula. Mother states that she would like to try pumping breast milk but she tried and could not see any milk output with her attempt. Praise given to mother for attempting to pump breast and help offered to mother with pumping. Mother agreed.     Stanley Nicolle Walker breast pump set up at bedside. Instructed on proper usage and to adjust suction according to comfort level. Flange size incorrect at this time. Flanges fitting larger than desired but mother reports no pain during pumping session. Reviewed frequency and duration of pumping in order to promote and maintain full milk supply. Hands-on pumping technique reviewed. Encouraged hand expression after. Instructed on proper cleaning of breast pump parts. Reviewed proper milk handling, collection, storage, and transportation. Voices understanding. Mother collected 25 ML of EBM collected and placed at bedside.     Mother anticipates discharge home today. Reviewed signs of good attachment. Reviewed breast massage and compression during feedings and indications for use. Reviewed signs of effective milk transfer and instructed to call pediatrician and lactation if signs not present. Discussed expected feeding and output pattern for days of life 2, 3, 4, & 5+; mother instructed to call pediatrician and lactation if infant is not meeting feeding and output goals.     Reviewed signs of engorgement and expectant management. Reviewed signs of mastitis and instructed mother to call OB provider and lactation if any signs present. Discussed proper use of First Alert Form. Reviewed proper milk handling, collection and storage guidelines. Reviewed nursing diet and nutrition. Discussed resources for medication safety while breastfeeding. Reviewed available outpatient lactation resources.     Mother verbalizes understanding of all  education and counseling; she denies any further lactation needs or concerns at this time. Encouraged mother to contact lactation with any questions, concerns, or problems, contact number provided.

## 2023-08-25 NOTE — LACTATION NOTE
This note was copied from a baby's chart.  Lactation Rounding: infant feeding frequency and output WNL according to mother's report. Infant consuming majority formula at this time because mother unsure if latching is going well. Infant weight lost noted as -4%     Upon entering room, nurse finds infant in mother's arms and mother sitting on sofa. Mother verbalized being overwhelmed and nurse gave mother encouragement and listened to mother's concerns about breast feeding and not being able to do for infant. Reassurance given to mother and review of day one to day three teaching done. Baby is showing feeding cues. Helped mother to settle in a football position on the left breast. Reviewed deep asymmetric latch and proper positioning. Nurse is able to demonstrate and a deep latch was easily obtained. Audible swallows noted, and mother denies pain or discomfort. Baby fed until content, and nipple shape and color is WDL upon unlatching. Reviewed hand expression and nipple care.     Discussed mechanism of milk production and maintenance. Encouraged frequent feeds based on early cues, unrestricted access to the breast and frequent skin to skin contact. Discussed expected feeding and output pattern for day of life 3. Reinforced normalcy and importance of cluster feeding.     Nurse assessed mother's nipples. Mother's nipples appear WNL. No cracking, bleeding, redness,or blisters noted. Nipple care and hand expression reviewed. Mother verbalized understanding of all teaching and counseling at this time. Opportunity for questions given to mom. Mother verbalized no concerns at this time. Lactation contact information given to mother. Nurse instructed mother to call for assistance if need arises.

## 2023-08-25 NOTE — SUBJECTIVE & OBJECTIVE
"Interval History:     She is doing well this morning. She is tolerating a regular diet without nausea or vomiting. She is voiding spontaneously. She is ambulating. She has passed flatus, and has not a BM. Vaginal bleeding is mild. She denies fever or chills. Abdominal pain is mild and controlled with oral medications. She Is not breastfeeding. She desires circumcision for her male baby: not applicable.    Objective:     Vital Signs (Most Recent):  Temp: 98 °F (36.7 °C) (08/25/23 0805)  Pulse: 98 (08/25/23 0805)  Resp: 18 (08/25/23 0930)  BP: (!) 140/73 (08/25/23 0805)  SpO2: 100 % (08/24/23 1655) Vital Signs (24h Range):  Temp:  [97.3 °F (36.3 °C)-98.7 °F (37.1 °C)] 98 °F (36.7 °C)  Pulse:  [79-98] 98  Resp:  [16-18] 18  SpO2:  [100 %] 100 %  BP: (108-140)/(58-73) 140/73     Weight: 78.1 kg (172 lb 2.9 oz)  Body mass index is 33.63 kg/m².    No intake or output data in the 24 hours ending 08/25/23 0942      Significant Labs:  Lab Results   Component Value Date    GROUPTRH O POS 08/22/2023    HEPBSAG Non-reactive 06/08/2023    STREPBCULT (A) 07/26/2023     STREPTOCOCCUS AGALACTIAE (GROUP B)  In case of Penicillin allergy, call lab for further testing.  Beta-hemolytic streptococci are routinely susceptible to   penicillins,cephalosporins and carbapenems.       No results for input(s): "HGB", "HCT" in the last 48 hours.    I have personallly reviewed all pertinent lab results from the last 24 hours.    Physical Exam:   Constitutional: She is oriented to person, place, and time. She appears well-developed and well-nourished. No distress.       Cardiovascular:  Normal rate, regular rhythm, normal heart sounds and intact distal pulses.            No murmur heard.   Pulmonary/Chest: Effort normal and breath sounds normal. No respiratory distress. She has no wheezes. She has no rales.        Abdominal: Soft. Bowel sounds are normal. She exhibits abdominal incision (Aquacel dressing in place, clear/dry/intact). She exhibits " no distension. There is no abdominal tenderness. There is no guarding.   Uterine fundus firm, below umbilicus, mild tenderness (appropriate)             Musculoskeletal: Moves all extremeties. No edema.      Comments: No calf tenderness       Neurological: She is alert and oriented to person, place, and time.    Skin: Skin is warm and dry. No rash noted. She is not diaphoretic.

## 2023-08-25 NOTE — ASSESSMENT & PLAN NOTE
POD #3 s/p primary LTCS  Pt doing well, afebrile overnight.  Proceed with routine post-partum care, encouraged ambulation, aware ok to shower.  Pt counseled on management plan and discharge goals. Plan for discharge home today

## 2023-08-29 ENCOUNTER — TELEPHONE (OUTPATIENT)
Dept: OBSTETRICS AND GYNECOLOGY | Facility: CLINIC | Age: 38
End: 2023-08-29

## 2023-08-29 NOTE — TELEPHONE ENCOUNTER
Attempted to call patient x 2 regarding missed dressing removal appointment. Left voicemail. Unable to send "[employer]" message.       TARUN Cristobal, PA-C  OBGYN - Surgery  [facility]

## 2023-08-29 NOTE — TELEPHONE ENCOUNTER
----- Message from Clovis Owens CNM sent at 8/29/2023  3:46 PM CDT -----  Regarding: FW: post partum depression  Please call pt. She will need an appointment ASAP. Thank you.   ----- Message -----  From: Lani Bai MD  Sent: 8/29/2023   2:36 PM CDT  To: Clovis Owens CNM; [facility] Staff  Subject: post partum depression                           Hi,    This patient (mother of my patient) screening positive of PPD/PPA today. Per our protocol, we have to contact you to establish follow up for mother ASAP.      Thanks,      Lani Bai MD  Pediatrics

## 2023-08-29 NOTE — TELEPHONE ENCOUNTER
----- Message from Lani Bai MD sent at 8/29/2023  2:35 PM CDT -----  Regarding: post partum depression  Hi,    This patient (mother of my patient) screening positive of PPD/PPA today. Per our protocol, we have to contact you to establish follow up for mother ASAP.      Thanks,      Lani Bai MD  Pediatrics

## 2023-08-30 ENCOUNTER — TELEPHONE (OUTPATIENT)
Dept: OBSTETRICS AND GYNECOLOGY | Facility: CLINIC | Age: 38
End: 2023-08-30
Payer: MEDICAID

## 2023-10-03 ENCOUNTER — TELEPHONE (OUTPATIENT)
Dept: OBSTETRICS AND GYNECOLOGY | Facility: CLINIC | Age: 38
End: 2023-10-03
Payer: MEDICAID

## 2023-10-03 NOTE — TELEPHONE ENCOUNTER
Tried calling pt with no answer but left message in regards to appointment scheduled for tomorrow stating that she can keep her scheduled postpartum visit on 10/11/2023 and that she can be tested for pregnancy confirmation then.

## 2023-10-27 ENCOUNTER — TELEPHONE (OUTPATIENT)
Dept: OBSTETRICS AND GYNECOLOGY | Facility: CLINIC | Age: 38
End: 2023-10-27
Payer: MEDICAID

## 2023-10-27 NOTE — TELEPHONE ENCOUNTER
----- Message from Ricardo Banks sent at 10/27/2023 10:14 AM CDT -----  Contact: Carolyn Leon would like a call back at [phone] in regards to needing schedule an appointment for post partum and also to discuss birth control.  Thanks   Am

## 2023-11-27 PROBLEM — O40.3XX0 POLYHYDRAMNIOS IN THIRD TRIMESTER: Status: RESOLVED | Noted: 2023-06-08 | Resolved: 2023-11-27

## (undated) DEVICE — TAPE SILK 3IN

## (undated) DEVICE — PAD ABD 8X10 STERILE

## (undated) DEVICE — DRESSING AQUACEL AG 3.5X10IN

## (undated) DEVICE — TAPE SURG MEDIPORE 6X72IN